# Patient Record
Sex: FEMALE | URBAN - METROPOLITAN AREA
[De-identification: names, ages, dates, MRNs, and addresses within clinical notes are randomized per-mention and may not be internally consistent; named-entity substitution may affect disease eponyms.]

---

## 2018-09-07 ENCOUNTER — FOLLOW UP (OUTPATIENT)
Dept: URBAN - METROPOLITAN AREA CLINIC 11 | Facility: CLINIC | Age: 83
End: 2018-09-07

## 2018-09-07 DIAGNOSIS — H43.813: ICD-10-CM

## 2018-09-07 DIAGNOSIS — Z96.1: ICD-10-CM

## 2018-09-07 DIAGNOSIS — H35.3231: ICD-10-CM

## 2018-09-07 PROCEDURE — 92250 FUNDUS PHOTOGRAPHY W/I&R: CPT

## 2018-09-07 PROCEDURE — 92014 COMPRE OPH EXAM EST PT 1/>: CPT

## 2018-09-07 PROCEDURE — 92235 FLUORESCEIN ANGRPH MLTIFRAME: CPT

## 2018-09-07 PROCEDURE — 92134 CPTRZ OPH DX IMG PST SGM RTA: CPT

## 2018-09-07 ASSESSMENT — TONOMETRY
OD_IOP_MMHG: 16
OS_IOP_MMHG: 14

## 2018-09-07 ASSESSMENT — VISUAL ACUITY: OS_SC: 20/400

## 2018-09-14 ENCOUNTER — PROCEDURE ONLY (OUTPATIENT)
Dept: URBAN - METROPOLITAN AREA CLINIC 11 | Facility: CLINIC | Age: 83
End: 2018-09-14

## 2018-09-14 DIAGNOSIS — H35.3231: ICD-10-CM

## 2018-09-14 PROCEDURE — PFS LUCENTIS 0.5MG PREFILLED SYRINGE

## 2018-09-14 PROCEDURE — 67028 INJECTION EYE DRUG: CPT

## 2018-09-14 ASSESSMENT — TONOMETRY: OD_IOP_MMHG: 11

## 2018-09-21 ENCOUNTER — PROCEDURE ONLY (OUTPATIENT)
Dept: URBAN - METROPOLITAN AREA CLINIC 11 | Facility: CLINIC | Age: 83
End: 2018-09-21

## 2018-09-21 DIAGNOSIS — H35.3231: ICD-10-CM

## 2018-09-21 PROCEDURE — PFS LUCENTIS 0.5MG PREFILLED SYRINGE

## 2018-09-21 PROCEDURE — 67028 INJECTION EYE DRUG: CPT | Mod: 25,LT

## 2018-09-21 ASSESSMENT — VISUAL ACUITY: OS_SC: 20/400

## 2018-09-21 ASSESSMENT — TONOMETRY: OS_IOP_MMHG: 12

## 2018-10-25 ENCOUNTER — FOLLOW UP (OUTPATIENT)
Dept: URBAN - METROPOLITAN AREA CLINIC 11 | Facility: CLINIC | Age: 83
End: 2018-10-25

## 2018-10-25 DIAGNOSIS — H35.3231: ICD-10-CM

## 2018-10-25 PROCEDURE — 67028 INJECTION EYE DRUG: CPT

## 2018-10-25 PROCEDURE — 92012 INTRM OPH EXAM EST PATIENT: CPT | Mod: 25

## 2018-10-25 PROCEDURE — 92134 CPTRZ OPH DX IMG PST SGM RTA: CPT

## 2018-10-25 PROCEDURE — PFS LUCENTIS 0.5MG PREFILLED SYRINGE

## 2018-10-25 ASSESSMENT — TONOMETRY
OD_IOP_MMHG: 11
OS_IOP_MMHG: 10

## 2018-10-25 ASSESSMENT — VISUAL ACUITY
OS_SC: 20/400
OD_SC: CF 1FT

## 2018-11-01 ENCOUNTER — FOLLOW UP (OUTPATIENT)
Dept: URBAN - METROPOLITAN AREA CLINIC 11 | Facility: CLINIC | Age: 83
End: 2018-11-01

## 2018-11-01 DIAGNOSIS — H35.3231: ICD-10-CM

## 2018-11-01 PROCEDURE — 67028 INJECTION EYE DRUG: CPT

## 2018-11-01 PROCEDURE — PFS LUCENTIS 0.5MG PREFILLED SYRINGE

## 2018-11-01 ASSESSMENT — VISUAL ACUITY: OS_SC: 20/400

## 2018-11-01 ASSESSMENT — TONOMETRY: OS_IOP_MMHG: 16

## 2018-11-28 ENCOUNTER — FOLLOW UP (OUTPATIENT)
Dept: URBAN - METROPOLITAN AREA CLINIC 11 | Facility: CLINIC | Age: 83
End: 2018-11-28

## 2018-11-28 DIAGNOSIS — H35.3231: ICD-10-CM

## 2018-11-28 PROCEDURE — 92012 INTRM OPH EXAM EST PATIENT: CPT | Mod: 25

## 2018-11-28 PROCEDURE — 67028 INJECTION EYE DRUG: CPT

## 2018-11-28 PROCEDURE — PFS LUCENTIS 0.5MG PREFILLED SYRINGE

## 2018-11-28 PROCEDURE — 92134 CPTRZ OPH DX IMG PST SGM RTA: CPT

## 2018-11-28 ASSESSMENT — VISUAL ACUITY
OD_SC: CF 1FT
OS_SC: 20/400

## 2018-11-28 ASSESSMENT — TONOMETRY
OD_IOP_MMHG: 12
OS_IOP_MMHG: 13

## 2018-11-30 ENCOUNTER — PROCEDURE ONLY (OUTPATIENT)
Dept: URBAN - METROPOLITAN AREA CLINIC 11 | Facility: CLINIC | Age: 83
End: 2018-11-30

## 2018-11-30 DIAGNOSIS — H35.3231: ICD-10-CM

## 2018-11-30 PROCEDURE — 67028 INJECTION EYE DRUG: CPT

## 2018-11-30 ASSESSMENT — VISUAL ACUITY: OS_SC: 20/400

## 2018-11-30 ASSESSMENT — TONOMETRY: OS_IOP_MMHG: 12

## 2019-05-23 ENCOUNTER — FOLLOW UP (OUTPATIENT)
Dept: URBAN - METROPOLITAN AREA CLINIC 11 | Facility: CLINIC | Age: 84
End: 2019-05-23

## 2019-05-23 DIAGNOSIS — H35.3231: ICD-10-CM

## 2019-05-23 DIAGNOSIS — Z96.1: ICD-10-CM

## 2019-05-23 DIAGNOSIS — H43.813: ICD-10-CM

## 2019-05-23 PROCEDURE — 92235 FLUORESCEIN ANGRPH MLTIFRAME: CPT

## 2019-05-23 PROCEDURE — 67028 INJECTION EYE DRUG: CPT

## 2019-05-23 PROCEDURE — 92134 CPTRZ OPH DX IMG PST SGM RTA: CPT

## 2019-05-23 PROCEDURE — PFS LUCENTIS 0.5MG PREFILLED SYRINGE

## 2019-05-23 PROCEDURE — 92014 COMPRE OPH EXAM EST PT 1/>: CPT | Mod: 25

## 2019-05-23 PROCEDURE — 92250 FUNDUS PHOTOGRAPHY W/I&R: CPT

## 2019-05-23 ASSESSMENT — VISUAL ACUITY
OD_SC: CF 1FT
OS_SC: 20/400

## 2019-05-23 ASSESSMENT — TONOMETRY
OD_IOP_MMHG: 10
OS_IOP_MMHG: 11

## 2019-07-11 ENCOUNTER — FOLLOW UP (OUTPATIENT)
Dept: URBAN - METROPOLITAN AREA CLINIC 11 | Facility: CLINIC | Age: 84
End: 2019-07-11

## 2019-07-11 DIAGNOSIS — H43.813: ICD-10-CM

## 2019-07-11 DIAGNOSIS — H35.3231: ICD-10-CM

## 2019-07-11 DIAGNOSIS — Z96.1: ICD-10-CM

## 2019-07-11 PROCEDURE — 92134 CPTRZ OPH DX IMG PST SGM RTA: CPT

## 2019-07-11 PROCEDURE — 92012 INTRM OPH EXAM EST PATIENT: CPT | Mod: 25

## 2019-07-11 PROCEDURE — PFS LUCENTIS 0.5MG PREFILLED SYRINGE

## 2019-07-11 PROCEDURE — 67028 INJECTION EYE DRUG: CPT

## 2019-07-11 ASSESSMENT — VISUAL ACUITY
OD_SC: CF 1FT
OS_SC: 20/400

## 2019-07-11 ASSESSMENT — TONOMETRY
OD_IOP_MMHG: 13
OS_IOP_MMHG: 12

## 2019-09-05 ENCOUNTER — FOLLOW UP (OUTPATIENT)
Dept: URBAN - METROPOLITAN AREA CLINIC 11 | Facility: CLINIC | Age: 84
End: 2019-09-05

## 2019-09-05 DIAGNOSIS — H35.3231: ICD-10-CM

## 2019-09-05 DIAGNOSIS — H43.813: ICD-10-CM

## 2019-09-05 DIAGNOSIS — Z96.1: ICD-10-CM

## 2019-09-05 PROCEDURE — PFS LUCENTIS 0.5MG PREFILLED SYRINGE

## 2019-09-05 PROCEDURE — 92134 CPTRZ OPH DX IMG PST SGM RTA: CPT

## 2019-09-05 PROCEDURE — 92014 COMPRE OPH EXAM EST PT 1/>: CPT | Mod: 25

## 2019-09-05 PROCEDURE — 67028 INJECTION EYE DRUG: CPT

## 2019-09-05 ASSESSMENT — TONOMETRY
OD_IOP_MMHG: 14
OS_IOP_MMHG: 12

## 2019-09-05 ASSESSMENT — VISUAL ACUITY
OS_SC: 20/400
OD_SC: CF 1FT

## 2020-01-01 ENCOUNTER — APPOINTMENT (OUTPATIENT)
Dept: GENERAL RADIOLOGY | Age: 85
DRG: 536 | End: 2020-01-01
Attending: EMERGENCY MEDICINE
Payer: MEDICARE

## 2020-01-01 ENCOUNTER — HOSPITAL ENCOUNTER (INPATIENT)
Age: 85
LOS: 1 days | DRG: 951 | End: 2020-07-06
Attending: FAMILY MEDICINE | Admitting: FAMILY MEDICINE
Payer: OTHER MISCELLANEOUS

## 2020-01-01 ENCOUNTER — HOSPITAL ENCOUNTER (INPATIENT)
Age: 85
LOS: 1 days | Discharge: HOSPICE | DRG: 065 | End: 2020-07-05
Attending: EMERGENCY MEDICINE | Admitting: INTERNAL MEDICINE
Payer: MEDICARE

## 2020-01-01 ENCOUNTER — HOSPITAL ENCOUNTER (INPATIENT)
Age: 85
LOS: 1 days | Discharge: HOME HEALTH | DRG: 536 | End: 2020-07-03
Attending: EMERGENCY MEDICINE | Admitting: INTERNAL MEDICINE
Payer: MEDICARE

## 2020-01-01 ENCOUNTER — APPOINTMENT (OUTPATIENT)
Dept: GENERAL RADIOLOGY | Age: 85
DRG: 536 | End: 2020-01-01
Attending: INTERNAL MEDICINE
Payer: MEDICARE

## 2020-01-01 ENCOUNTER — HOSPICE ADMISSION (OUTPATIENT)
Dept: HOSPICE | Facility: HOSPICE | Age: 85
End: 2020-01-01
Payer: MEDICARE

## 2020-01-01 ENCOUNTER — HOME CARE VISIT (OUTPATIENT)
Dept: HOSPICE | Facility: HOSPICE | Age: 85
End: 2020-01-01
Payer: MEDICARE

## 2020-01-01 ENCOUNTER — APPOINTMENT (OUTPATIENT)
Dept: CT IMAGING | Age: 85
DRG: 065 | End: 2020-01-01
Attending: EMERGENCY MEDICINE
Payer: MEDICARE

## 2020-01-01 VITALS
WEIGHT: 129.41 LBS | DIASTOLIC BLOOD PRESSURE: 83 MMHG | OXYGEN SATURATION: 91 % | RESPIRATION RATE: 18 BRPM | TEMPERATURE: 98.5 F | SYSTOLIC BLOOD PRESSURE: 152 MMHG | HEART RATE: 97 BPM | BODY MASS INDEX: 22.93 KG/M2 | HEIGHT: 63 IN

## 2020-01-01 VITALS
WEIGHT: 129.41 LBS | HEART RATE: 74 BPM | OXYGEN SATURATION: 97 % | BODY MASS INDEX: 21.56 KG/M2 | HEIGHT: 65 IN | DIASTOLIC BLOOD PRESSURE: 73 MMHG | RESPIRATION RATE: 18 BRPM | TEMPERATURE: 98 F | SYSTOLIC BLOOD PRESSURE: 162 MMHG

## 2020-01-01 DIAGNOSIS — I61.9 HEMORRHAGIC STROKE (HCC): Primary | ICD-10-CM

## 2020-01-01 DIAGNOSIS — S72.002A CLOSED FRACTURE OF LEFT HIP, INITIAL ENCOUNTER (HCC): ICD-10-CM

## 2020-01-01 DIAGNOSIS — R29.6 FALLS FREQUENTLY: ICD-10-CM

## 2020-01-01 DIAGNOSIS — Z71.89 GOALS OF CARE, COUNSELING/DISCUSSION: ICD-10-CM

## 2020-01-01 DIAGNOSIS — K11.7 INCREASED OROPHARYNGEAL SECRETIONS: ICD-10-CM

## 2020-01-01 DIAGNOSIS — F41.9 ANXIETY: Primary | ICD-10-CM

## 2020-01-01 DIAGNOSIS — R40.0 SOMNOLENCE: ICD-10-CM

## 2020-01-01 LAB
ABO + RH BLD: NORMAL
ALBUMIN SERPL-MCNC: 2.6 G/DL (ref 3.5–5)
ALBUMIN SERPL-MCNC: 2.7 G/DL (ref 3.5–5)
ALBUMIN SERPL-MCNC: 3 G/DL (ref 3.5–5)
ALBUMIN/GLOB SERPL: 0.7 {RATIO} (ref 1.1–2.2)
ALP SERPL-CCNC: 122 U/L (ref 45–117)
ALP SERPL-CCNC: 124 U/L (ref 45–117)
ALP SERPL-CCNC: 126 U/L (ref 45–117)
ALT SERPL-CCNC: 24 U/L (ref 12–78)
ALT SERPL-CCNC: 25 U/L (ref 12–78)
ALT SERPL-CCNC: 87 U/L (ref 12–78)
ANION GAP SERPL CALC-SCNC: 6 MMOL/L (ref 5–15)
ANION GAP SERPL CALC-SCNC: 7 MMOL/L (ref 5–15)
AST SERPL-CCNC: 18 U/L (ref 15–37)
AST SERPL-CCNC: 19 U/L (ref 15–37)
AST SERPL-CCNC: 57 U/L (ref 15–37)
ATRIAL RATE: 86 BPM
BASOPHILS # BLD: 0 K/UL (ref 0–0.1)
BASOPHILS NFR BLD: 0 % (ref 0–1)
BASOPHILS NFR BLD: 1 % (ref 0–1)
BILIRUB SERPL-MCNC: 0.3 MG/DL (ref 0.2–1)
BILIRUB SERPL-MCNC: 0.4 MG/DL (ref 0.2–1)
BILIRUB SERPL-MCNC: 0.4 MG/DL (ref 0.2–1)
BLOOD GROUP ANTIBODIES SERPL: NORMAL
BUN SERPL-MCNC: 18 MG/DL (ref 6–20)
BUN SERPL-MCNC: 20 MG/DL (ref 6–20)
BUN SERPL-MCNC: 21 MG/DL (ref 6–20)
BUN SERPL-MCNC: 23 MG/DL (ref 6–20)
BUN/CREAT SERPL: 18 (ref 12–20)
BUN/CREAT SERPL: 18 (ref 12–20)
BUN/CREAT SERPL: 22 (ref 12–20)
BUN/CREAT SERPL: 22 (ref 12–20)
CALCIUM SERPL-MCNC: 8.4 MG/DL (ref 8.5–10.1)
CALCIUM SERPL-MCNC: 8.6 MG/DL (ref 8.5–10.1)
CALCIUM SERPL-MCNC: 8.7 MG/DL (ref 8.5–10.1)
CALCIUM SERPL-MCNC: 8.8 MG/DL (ref 8.5–10.1)
CALCULATED P AXIS, ECG09: 38 DEGREES
CALCULATED R AXIS, ECG10: -17 DEGREES
CALCULATED T AXIS, ECG11: -3 DEGREES
CHLORIDE SERPL-SCNC: 104 MMOL/L (ref 97–108)
CHLORIDE SERPL-SCNC: 105 MMOL/L (ref 97–108)
CHLORIDE SERPL-SCNC: 107 MMOL/L (ref 97–108)
CHLORIDE SERPL-SCNC: 108 MMOL/L (ref 97–108)
CO2 SERPL-SCNC: 22 MMOL/L (ref 21–32)
CO2 SERPL-SCNC: 22 MMOL/L (ref 21–32)
CO2 SERPL-SCNC: 24 MMOL/L (ref 21–32)
CO2 SERPL-SCNC: 26 MMOL/L (ref 21–32)
COMMENT, HOLDF: NORMAL
CREAT SERPL-MCNC: 0.83 MG/DL (ref 0.55–1.02)
CREAT SERPL-MCNC: 0.91 MG/DL (ref 0.55–1.02)
CREAT SERPL-MCNC: 1.19 MG/DL (ref 0.55–1.02)
CREAT SERPL-MCNC: 1.28 MG/DL (ref 0.55–1.02)
DIAGNOSIS, 93000: NORMAL
DIFFERENTIAL METHOD BLD: ABNORMAL
EOSINOPHIL # BLD: 0 K/UL (ref 0–0.4)
EOSINOPHIL # BLD: 0 K/UL (ref 0–0.4)
EOSINOPHIL # BLD: 0.2 K/UL (ref 0–0.4)
EOSINOPHIL # BLD: 0.2 K/UL (ref 0–0.4)
EOSINOPHIL NFR BLD: 0 % (ref 0–7)
EOSINOPHIL NFR BLD: 0 % (ref 0–7)
EOSINOPHIL NFR BLD: 3 % (ref 0–7)
EOSINOPHIL NFR BLD: 3 % (ref 0–7)
ERYTHROCYTE [DISTWIDTH] IN BLOOD BY AUTOMATED COUNT: 14.1 % (ref 11.5–14.5)
ERYTHROCYTE [DISTWIDTH] IN BLOOD BY AUTOMATED COUNT: 14.1 % (ref 11.5–14.5)
ERYTHROCYTE [DISTWIDTH] IN BLOOD BY AUTOMATED COUNT: 14.3 % (ref 11.5–14.5)
ERYTHROCYTE [DISTWIDTH] IN BLOOD BY AUTOMATED COUNT: 14.3 % (ref 11.5–14.5)
GLOBULIN SER CALC-MCNC: 3.7 G/DL (ref 2–4)
GLOBULIN SER CALC-MCNC: 4.1 G/DL (ref 2–4)
GLOBULIN SER CALC-MCNC: 4.1 G/DL (ref 2–4)
GLUCOSE BLD STRIP.AUTO-MCNC: 149 MG/DL (ref 65–100)
GLUCOSE SERPL-MCNC: 101 MG/DL (ref 65–100)
GLUCOSE SERPL-MCNC: 102 MG/DL (ref 65–100)
GLUCOSE SERPL-MCNC: 109 MG/DL (ref 65–100)
GLUCOSE SERPL-MCNC: 147 MG/DL (ref 65–100)
HCT VFR BLD AUTO: 34.6 % (ref 35–47)
HCT VFR BLD AUTO: 34.9 % (ref 35–47)
HCT VFR BLD AUTO: 34.9 % (ref 35–47)
HCT VFR BLD AUTO: 37.5 % (ref 35–47)
HGB BLD-MCNC: 11.3 G/DL (ref 11.5–16)
HGB BLD-MCNC: 11.5 G/DL (ref 11.5–16)
HGB BLD-MCNC: 11.8 G/DL (ref 11.5–16)
HGB BLD-MCNC: 12.2 G/DL (ref 11.5–16)
IMM GRANULOCYTES # BLD AUTO: 0 K/UL (ref 0–0.04)
IMM GRANULOCYTES # BLD AUTO: 0.1 K/UL (ref 0–0.04)
IMM GRANULOCYTES NFR BLD AUTO: 1 % (ref 0–0.5)
INR PPP: 1 (ref 0.9–1.1)
INR PPP: 1 (ref 0.9–1.1)
LYMPHOCYTES # BLD: 0.9 K/UL (ref 0.8–3.5)
LYMPHOCYTES # BLD: 1.6 K/UL (ref 0.8–3.5)
LYMPHOCYTES # BLD: 1.9 K/UL (ref 0.8–3.5)
LYMPHOCYTES # BLD: 2 K/UL (ref 0.8–3.5)
LYMPHOCYTES NFR BLD: 12 % (ref 12–49)
LYMPHOCYTES NFR BLD: 21 % (ref 12–49)
LYMPHOCYTES NFR BLD: 28 % (ref 12–49)
LYMPHOCYTES NFR BLD: 29 % (ref 12–49)
MCH RBC QN AUTO: 28 PG (ref 26–34)
MCH RBC QN AUTO: 28.4 PG (ref 26–34)
MCH RBC QN AUTO: 29 PG (ref 26–34)
MCH RBC QN AUTO: 29.1 PG (ref 26–34)
MCHC RBC AUTO-ENTMCNC: 32.4 G/DL (ref 30–36.5)
MCHC RBC AUTO-ENTMCNC: 32.5 G/DL (ref 30–36.5)
MCHC RBC AUTO-ENTMCNC: 33.2 G/DL (ref 30–36.5)
MCHC RBC AUTO-ENTMCNC: 33.8 G/DL (ref 30–36.5)
MCV RBC AUTO: 86 FL (ref 80–99)
MCV RBC AUTO: 86.6 FL (ref 80–99)
MCV RBC AUTO: 87.4 FL (ref 80–99)
MCV RBC AUTO: 87.4 FL (ref 80–99)
MONOCYTES # BLD: 0.4 K/UL (ref 0–1)
MONOCYTES # BLD: 0.8 K/UL (ref 0–1)
MONOCYTES # BLD: 0.8 K/UL (ref 0–1)
MONOCYTES # BLD: 1.1 K/UL (ref 0–1)
MONOCYTES NFR BLD: 12 % (ref 5–13)
MONOCYTES NFR BLD: 12 % (ref 5–13)
MONOCYTES NFR BLD: 15 % (ref 5–13)
MONOCYTES NFR BLD: 6 % (ref 5–13)
NEUTS SEG # BLD: 3.6 K/UL (ref 1.8–8)
NEUTS SEG # BLD: 3.9 K/UL (ref 1.8–8)
NEUTS SEG # BLD: 4.7 K/UL (ref 1.8–8)
NEUTS SEG # BLD: 5.6 K/UL (ref 1.8–8)
NEUTS SEG NFR BLD: 54 % (ref 32–75)
NEUTS SEG NFR BLD: 56 % (ref 32–75)
NEUTS SEG NFR BLD: 63 % (ref 32–75)
NEUTS SEG NFR BLD: 81 % (ref 32–75)
NRBC # BLD: 0 K/UL (ref 0–0.01)
NRBC BLD-RTO: 0 PER 100 WBC
P-R INTERVAL, ECG05: 168 MS
PLATELET # BLD AUTO: 240 K/UL (ref 150–400)
PLATELET # BLD AUTO: 251 K/UL (ref 150–400)
PLATELET # BLD AUTO: 268 K/UL (ref 150–400)
PLATELET # BLD AUTO: 307 K/UL (ref 150–400)
PMV BLD AUTO: 9.2 FL (ref 8.9–12.9)
PMV BLD AUTO: 9.4 FL (ref 8.9–12.9)
PMV BLD AUTO: 9.4 FL (ref 8.9–12.9)
PMV BLD AUTO: 9.5 FL (ref 8.9–12.9)
POTASSIUM SERPL-SCNC: 4.1 MMOL/L (ref 3.5–5.1)
POTASSIUM SERPL-SCNC: 4.2 MMOL/L (ref 3.5–5.1)
POTASSIUM SERPL-SCNC: 4.3 MMOL/L (ref 3.5–5.1)
POTASSIUM SERPL-SCNC: 4.3 MMOL/L (ref 3.5–5.1)
PROT SERPL-MCNC: 6.3 G/DL (ref 6.4–8.2)
PROT SERPL-MCNC: 6.8 G/DL (ref 6.4–8.2)
PROT SERPL-MCNC: 7.1 G/DL (ref 6.4–8.2)
PROTHROMBIN TIME: 10.4 SEC (ref 9–11.1)
PROTHROMBIN TIME: 10.9 SEC (ref 9–11.1)
Q-T INTERVAL, ECG07: 350 MS
QRS DURATION, ECG06: 60 MS
QTC CALCULATION (BEZET), ECG08: 418 MS
RBC # BLD AUTO: 3.96 M/UL (ref 3.8–5.2)
RBC # BLD AUTO: 4.03 M/UL (ref 3.8–5.2)
RBC # BLD AUTO: 4.06 M/UL (ref 3.8–5.2)
RBC # BLD AUTO: 4.29 M/UL (ref 3.8–5.2)
SAMPLES BEING HELD,HOLD: NORMAL
SARS-COV-2, COV2: NOT DETECTED
SERVICE CMNT-IMP: ABNORMAL
SODIUM SERPL-SCNC: 135 MMOL/L (ref 136–145)
SODIUM SERPL-SCNC: 136 MMOL/L (ref 136–145)
SODIUM SERPL-SCNC: 137 MMOL/L (ref 136–145)
SODIUM SERPL-SCNC: 137 MMOL/L (ref 136–145)
SOURCE, COVRS: NORMAL
SPECIMEN EXP DATE BLD: NORMAL
SPECIMEN SOURCE, FCOV2M: NORMAL
TROPONIN I SERPL-MCNC: <0.05 NG/ML
VENTRICULAR RATE, ECG03: 86 BPM
WBC # BLD AUTO: 6.6 K/UL (ref 3.6–11)
WBC # BLD AUTO: 7 K/UL (ref 3.6–11)
WBC # BLD AUTO: 7 K/UL (ref 3.6–11)
WBC # BLD AUTO: 7.5 K/UL (ref 3.6–11)

## 2020-01-01 PROCEDURE — 74011250636 HC RX REV CODE- 250/636: Performed by: PHYSICAL MEDICINE & REHABILITATION

## 2020-01-01 PROCEDURE — 93005 ELECTROCARDIOGRAM TRACING: CPT

## 2020-01-01 PROCEDURE — 3336500001 HSPC ELECTION

## 2020-01-01 PROCEDURE — 36415 COLL VENOUS BLD VENIPUNCTURE: CPT

## 2020-01-01 PROCEDURE — 65270000015 HC RM PRIVATE ONCOLOGY

## 2020-01-01 PROCEDURE — 80048 BASIC METABOLIC PNL TOTAL CA: CPT

## 2020-01-01 PROCEDURE — 74011250637 HC RX REV CODE- 250/637: Performed by: INTERNAL MEDICINE

## 2020-01-01 PROCEDURE — 71045 X-RAY EXAM CHEST 1 VIEW: CPT

## 2020-01-01 PROCEDURE — 74011250636 HC RX REV CODE- 250/636: Performed by: INTERNAL MEDICINE

## 2020-01-01 PROCEDURE — 74011000250 HC RX REV CODE- 250: Performed by: NURSE PRACTITIONER

## 2020-01-01 PROCEDURE — 74011636637 HC RX REV CODE- 636/637: Performed by: INTERNAL MEDICINE

## 2020-01-01 PROCEDURE — 80053 COMPREHEN METABOLIC PANEL: CPT

## 2020-01-01 PROCEDURE — 77030005513 HC CATH URETH FOL11 MDII -B

## 2020-01-01 PROCEDURE — 70450 CT HEAD/BRAIN W/O DYE: CPT

## 2020-01-01 PROCEDURE — 96374 THER/PROPH/DIAG INJ IV PUSH: CPT

## 2020-01-01 PROCEDURE — 77030013160 HC PROTCT ARM THMB DERY -A

## 2020-01-01 PROCEDURE — 85610 PROTHROMBIN TIME: CPT

## 2020-01-01 PROCEDURE — 97530 THERAPEUTIC ACTIVITIES: CPT

## 2020-01-01 PROCEDURE — 86900 BLOOD TYPING SEROLOGIC ABO: CPT

## 2020-01-01 PROCEDURE — 87635 SARS-COV-2 COVID-19 AMP PRB: CPT

## 2020-01-01 PROCEDURE — 0656 HSPC GENERAL INPATIENT

## 2020-01-01 PROCEDURE — 97535 SELF CARE MNGMENT TRAINING: CPT | Performed by: OCCUPATIONAL THERAPIST

## 2020-01-01 PROCEDURE — 82962 GLUCOSE BLOOD TEST: CPT

## 2020-01-01 PROCEDURE — 99285 EMERGENCY DEPT VISIT HI MDM: CPT

## 2020-01-01 PROCEDURE — 85025 COMPLETE CBC W/AUTO DIFF WBC: CPT

## 2020-01-01 PROCEDURE — 97165 OT EVAL LOW COMPLEX 30 MIN: CPT | Performed by: OCCUPATIONAL THERAPIST

## 2020-01-01 PROCEDURE — 97161 PT EVAL LOW COMPLEX 20 MIN: CPT | Performed by: PHYSICAL THERAPIST

## 2020-01-01 PROCEDURE — 77030041247 HC PROTECTOR HEEL HEELMEDIX MDII -B

## 2020-01-01 PROCEDURE — 96372 THER/PROPH/DIAG INJ SC/IM: CPT

## 2020-01-01 PROCEDURE — 74011000250 HC RX REV CODE- 250: Performed by: PHYSICAL MEDICINE & REHABILITATION

## 2020-01-01 PROCEDURE — 99218 HC RM OBSERVATION: CPT

## 2020-01-01 PROCEDURE — 84484 ASSAY OF TROPONIN QUANT: CPT

## 2020-01-01 PROCEDURE — 77030038269 HC DRN EXT URIN PURWCK BARD -A

## 2020-01-01 PROCEDURE — 65270000029 HC RM PRIVATE

## 2020-01-01 PROCEDURE — 74011250636 HC RX REV CODE- 250/636: Performed by: NURSE PRACTITIONER

## 2020-01-01 PROCEDURE — 73502 X-RAY EXAM HIP UNI 2-3 VIEWS: CPT

## 2020-01-01 PROCEDURE — 97530 THERAPEUTIC ACTIVITIES: CPT | Performed by: PHYSICAL THERAPIST

## 2020-01-01 PROCEDURE — 94760 N-INVAS EAR/PLS OXIMETRY 1: CPT

## 2020-01-01 PROCEDURE — 97530 THERAPEUTIC ACTIVITIES: CPT | Performed by: OCCUPATIONAL THERAPIST

## 2020-01-01 RX ORDER — PREDNISONE 10 MG/1
TABLET ORAL
Qty: 30 TAB | Refills: 0 | Status: SHIPPED | OUTPATIENT
Start: 2020-01-01

## 2020-01-01 RX ORDER — LORAZEPAM 2 MG/ML
1 INJECTION INTRAMUSCULAR
Status: DISCONTINUED | OUTPATIENT
Start: 2020-01-01 | End: 2020-01-01 | Stop reason: HOSPADM

## 2020-01-01 RX ORDER — FACIAL-BODY WIPES
10 EACH TOPICAL DAILY PRN
Status: DISCONTINUED | OUTPATIENT
Start: 2020-01-01 | End: 2020-01-01 | Stop reason: HOSPADM

## 2020-01-01 RX ORDER — LEVOTHYROXINE SODIUM 25 UG/1
25 TABLET ORAL
COMMUNITY

## 2020-01-01 RX ORDER — METOPROLOL TARTRATE 25 MG/1
25 TABLET, FILM COATED ORAL 2 TIMES DAILY
Status: DISCONTINUED | OUTPATIENT
Start: 2020-01-01 | End: 2020-01-01 | Stop reason: HOSPADM

## 2020-01-01 RX ORDER — ONDANSETRON 2 MG/ML
4 INJECTION INTRAMUSCULAR; INTRAVENOUS
Status: DISCONTINUED | OUTPATIENT
Start: 2020-01-01 | End: 2020-01-01 | Stop reason: HOSPADM

## 2020-01-01 RX ORDER — MORPHINE SULFATE 2 MG/ML
2 INJECTION, SOLUTION INTRAMUSCULAR; INTRAVENOUS
Status: DISCONTINUED | OUTPATIENT
Start: 2020-01-01 | End: 2020-01-01 | Stop reason: HOSPADM

## 2020-01-01 RX ORDER — POTASSIUM CHLORIDE 750 MG/1
10 TABLET, FILM COATED, EXTENDED RELEASE ORAL DAILY
COMMUNITY
End: 2020-01-01

## 2020-01-01 RX ORDER — GLYCOPYRROLATE 0.2 MG/ML
0.1 INJECTION INTRAMUSCULAR; INTRAVENOUS
Status: DISCONTINUED | OUTPATIENT
Start: 2020-01-01 | End: 2020-01-01 | Stop reason: HOSPADM

## 2020-01-01 RX ORDER — LOSARTAN POTASSIUM 100 MG/1
100 TABLET ORAL DAILY
COMMUNITY

## 2020-01-01 RX ORDER — SODIUM CHLORIDE 0.9 % (FLUSH) 0.9 %
10 SYRINGE (ML) INJECTION
Status: DISCONTINUED | OUTPATIENT
Start: 2020-01-01 | End: 2020-01-01

## 2020-01-01 RX ORDER — SODIUM CHLORIDE 9 MG/ML
75 INJECTION, SOLUTION INTRAVENOUS CONTINUOUS
Status: DISPENSED | OUTPATIENT
Start: 2020-01-01 | End: 2020-01-01

## 2020-01-01 RX ORDER — GLYCOPYRROLATE 0.2 MG/ML
0.1 INJECTION INTRAMUSCULAR; INTRAVENOUS ONCE
Status: DISCONTINUED | OUTPATIENT
Start: 2020-01-01 | End: 2020-01-01

## 2020-01-01 RX ORDER — BUSPIRONE HYDROCHLORIDE 5 MG/1
5 TABLET ORAL
COMMUNITY

## 2020-01-01 RX ORDER — ACETAMINOPHEN 500 MG
500 TABLET ORAL
COMMUNITY

## 2020-01-01 RX ORDER — FAMOTIDINE 20 MG/1
20 TABLET, FILM COATED ORAL DAILY
Status: DISCONTINUED | OUTPATIENT
Start: 2020-01-01 | End: 2020-01-01

## 2020-01-01 RX ORDER — HYOSCYAMINE SULFATE 0.12 MG/1
0.12 TABLET SUBLINGUAL EVERY 4 HOURS
Status: DISCONTINUED | OUTPATIENT
Start: 2020-01-01 | End: 2020-01-01

## 2020-01-01 RX ORDER — LEVOTHYROXINE SODIUM 25 UG/1
25 TABLET ORAL
Status: DISCONTINUED | OUTPATIENT
Start: 2020-01-01 | End: 2020-01-01

## 2020-01-01 RX ORDER — METOPROLOL TARTRATE 25 MG/1
25 TABLET, FILM COATED ORAL 2 TIMES DAILY
COMMUNITY

## 2020-01-01 RX ORDER — GLYCOPYRROLATE 0.2 MG/ML
0.2 INJECTION INTRAMUSCULAR; INTRAVENOUS EVERY 4 HOURS
Status: DISCONTINUED | OUTPATIENT
Start: 2020-01-01 | End: 2020-01-01 | Stop reason: HOSPADM

## 2020-01-01 RX ORDER — HEPARIN SODIUM 5000 [USP'U]/ML
5000 INJECTION, SOLUTION INTRAVENOUS; SUBCUTANEOUS EVERY 8 HOURS
Status: DISCONTINUED | OUTPATIENT
Start: 2020-01-01 | End: 2020-01-01 | Stop reason: HOSPADM

## 2020-01-01 RX ORDER — LEVOTHYROXINE SODIUM 25 UG/1
25 TABLET ORAL
Status: DISCONTINUED | OUTPATIENT
Start: 2020-01-01 | End: 2020-01-01 | Stop reason: HOSPADM

## 2020-01-01 RX ORDER — FLUOXETINE HYDROCHLORIDE 20 MG/1
20 CAPSULE ORAL DAILY
Status: DISCONTINUED | OUTPATIENT
Start: 2020-01-01 | End: 2020-01-01 | Stop reason: HOSPADM

## 2020-01-01 RX ORDER — LOSARTAN POTASSIUM 100 MG/1
100 TABLET ORAL DAILY
COMMUNITY
End: 2020-01-01

## 2020-01-01 RX ORDER — ACETAMINOPHEN 325 MG/1
650 TABLET ORAL
Status: DISCONTINUED | OUTPATIENT
Start: 2020-01-01 | End: 2020-01-01 | Stop reason: HOSPADM

## 2020-01-01 RX ORDER — CLONAZEPAM 0.5 MG/1
0.5 TABLET ORAL DAILY
Status: ON HOLD | COMMUNITY
End: 2020-01-01 | Stop reason: SDUPTHER

## 2020-01-01 RX ORDER — HALOPERIDOL 5 MG/ML
2 INJECTION INTRAMUSCULAR
Status: DISCONTINUED | OUTPATIENT
Start: 2020-01-01 | End: 2020-01-01 | Stop reason: HOSPADM

## 2020-01-01 RX ORDER — BUSPIRONE HYDROCHLORIDE 5 MG/1
5 TABLET ORAL
Status: DISCONTINUED | OUTPATIENT
Start: 2020-01-01 | End: 2020-01-01 | Stop reason: HOSPADM

## 2020-01-01 RX ORDER — MORPHINE SULFATE 2 MG/ML
1 INJECTION, SOLUTION INTRAMUSCULAR; INTRAVENOUS
Status: DISCONTINUED | OUTPATIENT
Start: 2020-01-01 | End: 2020-01-01 | Stop reason: HOSPADM

## 2020-01-01 RX ORDER — POTASSIUM CHLORIDE 750 MG/1
TABLET, EXTENDED RELEASE ORAL
COMMUNITY
Start: 2020-01-01

## 2020-01-01 RX ORDER — SODIUM CHLORIDE 0.9 % (FLUSH) 0.9 %
5-40 SYRINGE (ML) INJECTION AS NEEDED
Status: DISCONTINUED | OUTPATIENT
Start: 2020-01-01 | End: 2020-01-01 | Stop reason: HOSPADM

## 2020-01-01 RX ORDER — LIDOCAINE 50 MG/G
1 PATCH TOPICAL 2 TIMES DAILY
COMMUNITY

## 2020-01-01 RX ORDER — LORAZEPAM 2 MG/ML
1 INJECTION INTRAMUSCULAR EVERY 4 HOURS
Status: DISCONTINUED | OUTPATIENT
Start: 2020-01-01 | End: 2020-01-01 | Stop reason: HOSPADM

## 2020-01-01 RX ORDER — MORPHINE SULFATE 2 MG/ML
2 INJECTION, SOLUTION INTRAMUSCULAR; INTRAVENOUS EVERY 4 HOURS
Status: DISCONTINUED | OUTPATIENT
Start: 2020-01-01 | End: 2020-01-01 | Stop reason: HOSPADM

## 2020-01-01 RX ORDER — CLONAZEPAM 0.5 MG/1
0.5 TABLET ORAL DAILY
Qty: 15 TAB | Refills: 0 | Status: SHIPPED | OUTPATIENT
Start: 2020-01-01 | End: 2020-01-01

## 2020-01-01 RX ORDER — KETOROLAC TROMETHAMINE 30 MG/ML
30 INJECTION, SOLUTION INTRAMUSCULAR; INTRAVENOUS
Status: DISCONTINUED | OUTPATIENT
Start: 2020-01-01 | End: 2020-01-01 | Stop reason: HOSPADM

## 2020-01-01 RX ORDER — CLONAZEPAM 0.5 MG/1
0.5 TABLET ORAL DAILY
Status: DISCONTINUED | OUTPATIENT
Start: 2020-01-01 | End: 2020-01-01 | Stop reason: HOSPADM

## 2020-01-01 RX ORDER — FAMOTIDINE 20 MG/1
20 TABLET, FILM COATED ORAL 2 TIMES DAILY
Status: DISCONTINUED | OUTPATIENT
Start: 2020-01-01 | End: 2020-01-01

## 2020-01-01 RX ORDER — HYDRALAZINE HYDROCHLORIDE 20 MG/ML
10 INJECTION INTRAMUSCULAR; INTRAVENOUS
Status: DISCONTINUED | OUTPATIENT
Start: 2020-01-01 | End: 2020-01-01 | Stop reason: HOSPADM

## 2020-01-01 RX ORDER — PREDNISONE 50 MG/1
50 TABLET ORAL DAILY
Status: ON HOLD | COMMUNITY
End: 2020-01-01 | Stop reason: SDUPTHER

## 2020-01-01 RX ORDER — FLUOXETINE HYDROCHLORIDE 20 MG/1
20 CAPSULE ORAL DAILY
COMMUNITY

## 2020-01-01 RX ORDER — SODIUM CHLORIDE 9 MG/ML
100 INJECTION, SOLUTION INTRAVENOUS CONTINUOUS
Status: DISPENSED | OUTPATIENT
Start: 2020-01-01 | End: 2020-01-01

## 2020-01-01 RX ORDER — SODIUM CHLORIDE 0.9 % (FLUSH) 0.9 %
5-40 SYRINGE (ML) INJECTION EVERY 8 HOURS
Status: DISCONTINUED | OUTPATIENT
Start: 2020-01-01 | End: 2020-01-01 | Stop reason: HOSPADM

## 2020-01-01 RX ORDER — ACETAMINOPHEN 650 MG/1
650 SUPPOSITORY RECTAL
Status: DISCONTINUED | OUTPATIENT
Start: 2020-01-01 | End: 2020-01-01 | Stop reason: HOSPADM

## 2020-01-01 RX ORDER — HALOPERIDOL 5 MG/ML
1 INJECTION INTRAMUSCULAR ONCE
Status: COMPLETED | OUTPATIENT
Start: 2020-01-01 | End: 2020-01-01

## 2020-01-01 RX ADMIN — HALOPERIDOL LACTATE 1 MG: 5 INJECTION, SOLUTION INTRAMUSCULAR at 11:22

## 2020-01-01 RX ADMIN — METOPROLOL TARTRATE 25 MG: 25 TABLET, FILM COATED ORAL at 17:58

## 2020-01-01 RX ADMIN — MORPHINE SULFATE 2 MG: 2 INJECTION, SOLUTION INTRAMUSCULAR; INTRAVENOUS at 20:10

## 2020-01-01 RX ADMIN — HEPARIN SODIUM 5000 UNITS: 5000 INJECTION INTRAVENOUS; SUBCUTANEOUS at 08:15

## 2020-01-01 RX ADMIN — BUSPIRONE HYDROCHLORIDE 5 MG: 5 TABLET ORAL at 12:00

## 2020-01-01 RX ADMIN — HEPARIN SODIUM 5000 UNITS: 5000 INJECTION INTRAVENOUS; SUBCUTANEOUS at 02:13

## 2020-01-01 RX ADMIN — LORAZEPAM 1 MG: 2 INJECTION INTRAMUSCULAR; INTRAVENOUS at 23:39

## 2020-01-01 RX ADMIN — METOPROLOL TARTRATE 25 MG: 25 TABLET, FILM COATED ORAL at 18:04

## 2020-01-01 RX ADMIN — PREDNISONE 50 MG: 20 TABLET ORAL at 09:12

## 2020-01-01 RX ADMIN — HEPARIN SODIUM 5000 UNITS: 5000 INJECTION INTRAVENOUS; SUBCUTANEOUS at 08:57

## 2020-01-01 RX ADMIN — BUSPIRONE HYDROCHLORIDE 5 MG: 5 TABLET ORAL at 08:14

## 2020-01-01 RX ADMIN — FLUOXETINE 20 MG: 20 CAPSULE ORAL at 08:14

## 2020-01-01 RX ADMIN — LORAZEPAM 1 MG: 2 INJECTION INTRAMUSCULAR; INTRAVENOUS at 20:10

## 2020-01-01 RX ADMIN — HEPARIN SODIUM 5000 UNITS: 5000 INJECTION INTRAVENOUS; SUBCUTANEOUS at 17:46

## 2020-01-01 RX ADMIN — HEPARIN SODIUM 5000 UNITS: 5000 INJECTION INTRAVENOUS; SUBCUTANEOUS at 09:14

## 2020-01-01 RX ADMIN — PREDNISONE 50 MG: 20 TABLET ORAL at 08:57

## 2020-01-01 RX ADMIN — Medication 10 ML: at 05:58

## 2020-01-01 RX ADMIN — CLONAZEPAM 0.5 MG: 0.5 TABLET ORAL at 09:25

## 2020-01-01 RX ADMIN — Medication 10 ML: at 06:47

## 2020-01-01 RX ADMIN — GLYCOPYRROLATE 0.2 MG: 0.2 INJECTION, SOLUTION INTRAMUSCULAR; INTRAVENOUS at 12:30

## 2020-01-01 RX ADMIN — LEVOTHYROXINE SODIUM 25 MCG: 0.03 TABLET ORAL at 05:20

## 2020-01-01 RX ADMIN — HEPARIN SODIUM 5000 UNITS: 5000 INJECTION INTRAVENOUS; SUBCUTANEOUS at 18:04

## 2020-01-01 RX ADMIN — BUSPIRONE HYDROCHLORIDE 5 MG: 5 TABLET ORAL at 08:57

## 2020-01-01 RX ADMIN — BUSPIRONE HYDROCHLORIDE 5 MG: 5 TABLET ORAL at 17:47

## 2020-01-01 RX ADMIN — METOPROLOL TARTRATE 25 MG: 25 TABLET, FILM COATED ORAL at 08:58

## 2020-01-01 RX ADMIN — METOPROLOL TARTRATE 25 MG: 25 TABLET, FILM COATED ORAL at 08:14

## 2020-01-01 RX ADMIN — BUSPIRONE HYDROCHLORIDE 5 MG: 5 TABLET ORAL at 18:04

## 2020-01-01 RX ADMIN — SODIUM CHLORIDE 100 ML/HR: 900 INJECTION, SOLUTION INTRAVENOUS at 18:17

## 2020-01-01 RX ADMIN — HEPARIN SODIUM 5000 UNITS: 5000 INJECTION INTRAVENOUS; SUBCUTANEOUS at 09:49

## 2020-01-01 RX ADMIN — LORAZEPAM 1 MG: 2 INJECTION INTRAMUSCULAR; INTRAVENOUS at 16:01

## 2020-01-01 RX ADMIN — METOPROLOL TARTRATE 25 MG: 25 TABLET, FILM COATED ORAL at 17:47

## 2020-01-01 RX ADMIN — MORPHINE SULFATE 2 MG: 2 INJECTION, SOLUTION INTRAMUSCULAR; INTRAVENOUS at 18:34

## 2020-01-01 RX ADMIN — MORPHINE SULFATE 2 MG: 2 INJECTION, SOLUTION INTRAMUSCULAR; INTRAVENOUS at 16:00

## 2020-01-01 RX ADMIN — METOPROLOL TARTRATE 25 MG: 25 TABLET, FILM COATED ORAL at 09:13

## 2020-01-01 RX ADMIN — MORPHINE SULFATE 2 MG: 2 INJECTION, SOLUTION INTRAMUSCULAR; INTRAVENOUS at 23:40

## 2020-01-01 RX ADMIN — FLUOXETINE 20 MG: 20 CAPSULE ORAL at 09:47

## 2020-01-01 RX ADMIN — BUSPIRONE HYDROCHLORIDE 5 MG: 5 TABLET ORAL at 11:19

## 2020-01-01 RX ADMIN — HALOPERIDOL LACTATE 2 MG: 5 INJECTION, SOLUTION INTRAMUSCULAR at 00:12

## 2020-01-01 RX ADMIN — HEPARIN SODIUM 5000 UNITS: 5000 INJECTION INTRAVENOUS; SUBCUTANEOUS at 17:47

## 2020-01-01 RX ADMIN — METOPROLOL TARTRATE 25 MG: 25 TABLET, FILM COATED ORAL at 17:45

## 2020-01-01 RX ADMIN — LEVOTHYROXINE SODIUM 25 MCG: 0.03 TABLET ORAL at 06:19

## 2020-01-01 RX ADMIN — CLONAZEPAM 0.5 MG: 0.5 TABLET ORAL at 08:57

## 2020-01-01 RX ADMIN — GLYCOPYRROLATE 0.2 MG: 0.2 INJECTION, SOLUTION INTRAMUSCULAR; INTRAVENOUS at 20:10

## 2020-01-01 RX ADMIN — Medication 10 ML: at 13:03

## 2020-01-01 RX ADMIN — GLYCOPYRROLATE 0.2 MG: 0.2 INJECTION, SOLUTION INTRAMUSCULAR; INTRAVENOUS at 16:00

## 2020-01-01 RX ADMIN — SODIUM CHLORIDE 75 ML/HR: 900 INJECTION, SOLUTION INTRAVENOUS at 20:26

## 2020-01-01 RX ADMIN — LEVOTHYROXINE SODIUM 25 MCG: 0.03 TABLET ORAL at 06:54

## 2020-01-01 RX ADMIN — Medication 10 ML: at 06:21

## 2020-01-01 RX ADMIN — BUSPIRONE HYDROCHLORIDE 5 MG: 5 TABLET ORAL at 09:44

## 2020-01-01 RX ADMIN — HEPARIN SODIUM 5000 UNITS: 5000 INJECTION INTRAVENOUS; SUBCUTANEOUS at 01:17

## 2020-01-01 RX ADMIN — Medication 10 ML: at 21:01

## 2020-01-01 RX ADMIN — Medication 10 ML: at 23:31

## 2020-01-01 RX ADMIN — BUSPIRONE HYDROCHLORIDE 5 MG: 5 TABLET ORAL at 12:33

## 2020-01-01 RX ADMIN — SODIUM CHLORIDE 100 ML/HR: 900 INJECTION, SOLUTION INTRAVENOUS at 06:54

## 2020-01-01 RX ADMIN — LORAZEPAM 1 MG: 2 INJECTION INTRAMUSCULAR; INTRAVENOUS at 12:31

## 2020-01-01 RX ADMIN — MORPHINE SULFATE 2 MG: 2 INJECTION, SOLUTION INTRAMUSCULAR; INTRAVENOUS at 12:31

## 2020-01-01 RX ADMIN — GLYCOPYRROLATE 0.2 MG: 0.2 INJECTION, SOLUTION INTRAMUSCULAR; INTRAVENOUS at 23:39

## 2020-01-01 RX ADMIN — BUSPIRONE HYDROCHLORIDE 5 MG: 5 TABLET ORAL at 17:58

## 2020-01-01 RX ADMIN — PREDNISONE 50 MG: 20 TABLET ORAL at 09:47

## 2020-01-01 RX ADMIN — LEVOTHYROXINE SODIUM 25 MCG: 0.03 TABLET ORAL at 05:57

## 2020-01-01 RX ADMIN — BUSPIRONE HYDROCHLORIDE 5 MG: 5 TABLET ORAL at 13:03

## 2020-01-01 RX ADMIN — METOPROLOL TARTRATE 25 MG: 25 TABLET, FILM COATED ORAL at 09:44

## 2020-01-01 RX ADMIN — ACETAMINOPHEN 650 MG: 325 TABLET ORAL at 02:13

## 2020-01-01 RX ADMIN — BUSPIRONE HYDROCHLORIDE 5 MG: 5 TABLET ORAL at 16:45

## 2020-01-01 RX ADMIN — Medication 10 ML: at 22:19

## 2020-01-01 RX ADMIN — PREDNISONE 50 MG: 20 TABLET ORAL at 08:14

## 2020-01-01 RX ADMIN — LORAZEPAM 1 MG: 2 INJECTION INTRAMUSCULAR; INTRAVENOUS at 10:29

## 2020-01-01 RX ADMIN — CLONAZEPAM 0.5 MG: 0.5 TABLET ORAL at 09:47

## 2020-01-01 RX ADMIN — HEPARIN SODIUM 5000 UNITS: 5000 INJECTION INTRAVENOUS; SUBCUTANEOUS at 02:28

## 2020-01-01 RX ADMIN — ACETAMINOPHEN 650 MG: 325 TABLET ORAL at 09:45

## 2020-01-01 RX ADMIN — HEPARIN SODIUM 5000 UNITS: 5000 INJECTION INTRAVENOUS; SUBCUTANEOUS at 01:13

## 2020-01-01 RX ADMIN — CLONAZEPAM 0.5 MG: 0.5 TABLET ORAL at 08:14

## 2020-01-01 RX ADMIN — Medication 10 ML: at 14:29

## 2020-01-01 RX ADMIN — SODIUM CHLORIDE 75 ML/HR: 900 INJECTION, SOLUTION INTRAVENOUS at 11:23

## 2020-01-01 RX ADMIN — HEPARIN SODIUM 5000 UNITS: 5000 INJECTION INTRAVENOUS; SUBCUTANEOUS at 17:58

## 2020-01-01 RX ADMIN — Medication 10 ML: at 05:21

## 2020-01-01 RX ADMIN — GLYCOPYRROLATE 0.1 MG: 0.2 INJECTION, SOLUTION INTRAMUSCULAR; INTRAVENOUS at 10:28

## 2020-01-01 RX ADMIN — FLUOXETINE 20 MG: 20 CAPSULE ORAL at 08:57

## 2020-01-01 RX ADMIN — Medication 10 ML: at 22:00

## 2020-01-01 RX ADMIN — BUSPIRONE HYDROCHLORIDE 5 MG: 5 TABLET ORAL at 09:13

## 2020-01-01 RX ADMIN — MORPHINE SULFATE 1 MG: 2 INJECTION, SOLUTION INTRAMUSCULAR; INTRAVENOUS at 10:30

## 2020-01-01 RX ADMIN — FLUOXETINE 20 MG: 20 CAPSULE ORAL at 09:13

## 2020-06-29 PROBLEM — S72.009A HIP FRACTURE (HCC): Status: ACTIVE | Noted: 2020-01-01

## 2020-06-29 PROBLEM — R62.51 FAILURE TO THRIVE (0-17): Status: ACTIVE | Noted: 2020-01-01

## 2020-06-29 NOTE — ROUTINE PROCESS
Patient pleasantly confused, occasionally yells out help. Patient not trying to bet OOB. Trying to reorient patient without success. Patient spoke with daughter on phone.  
 
Elijah Martino RN

## 2020-06-29 NOTE — PROGRESS NOTES
LOUANN:  
1) [facility] 
2) Pt needs negative COVID-19 test and PT/OT consults 3) Attending to address code status and AMD with pt if appropriate 5:40 PM- ROSA has been in contact with pt's family all day regarding long term plans. Initally family wanted pt to go somewhere for 4-5 days and then transfer her to [place] where family is at. CM educated them on options including LTC's and Adult Group Homes, dtr Mattie Lyle declined and is now requesting for pt to go [facility] as she lives 5 minutes down the road. ROSA spoke with William Samaniego in Admissions at [facility]- they are contacting pt's family to arrange. RN Sangeeta Douglas from [facility] came to the ED and assessed pt stating she is appropriate- they will arrange for [facility] services while there. Pt to be brought in under observation- in need of negative COVID-19 test, PT/OT consults. CM will fax William Samaniego at [address] actively working with pt's family stating pt could be admitted Tuesday or Wednesday pending on how quickly family completes everything.  will inform floor CM who will continue to follow and assist as needed. 1:27 PM- ROSA received a phone call from pt's dtr requesting assistance as [facility] will not take pt back. ROSA spoke with He who confirmed this stating pt cannot come to the LTC or SNF side as they do not have beds. Pt's family inquired about hiring 24/7 care until a LTC bed becomes available, Neri declienbakari stating that is not allowed. ORSA requested [place] contact family. 12:28 PM- ROSA also left a HIPPA compliant voicemail for Mat Maxwell (SW) at [facility], waiting on a return phone call 12:24 PM- ROSA notified by attending that pt broke her hip and is in need of a higher level of care then what she is getting at [facility].  ROSA spoke with pt's dtr Mattie Lyle who is requesting to try and get pt back to [facility] but at another level of care, pt's dtr was told last week they did not have availability. CM contacted [facility], left a HIPPA compliant VM for Jl Burton, waiting on a return phone call. Care Management Interventions PCP Verified by CM: Yes Mode of Transport at Discharge: BLS Transition of Care Consult (CM Consult): Discharge Planning MyChart Signup: No 
Discharge Durable Medical Equipment: No 
Health Maintenance Reviewed: Yes Physical Therapy Consult: Yes Occupational Therapy Consult: Yes Speech Therapy Consult: No 
Current Support Network: Assisted Living Confirm Follow Up Transport: Family The Patient and/or Patient Representative was Provided with a Choice of Provider and Agrees with the Discharge Plan?: Yes Name of the Patient Representative Who was Provided with a Choice of Provider and Agrees with the Discharge Plan: Spoke with pt's dtr Marli Booker [employer] of Choice List was Provided with Basic Dialogue that Supports the Patient's Individualized Plan of Care/Goals, Treatment Preferences and Shares the Quality Data Associated with the Providers?: Yes Discharge Location Discharge Placement: Home with [facility] health[facility] BENNIE Baca, CM [facility] [phone]

## 2020-06-29 NOTE — ED PROVIDER NOTES
EMERGENCY DEPARTMENT HISTORY AND PHYSICAL EXAM 
 
 
Date: 6/29/2020 Patient Name: Holly Mon History of Presenting Illness Chief Complaint Patient presents with  
 Hip Pain Patient fell on 6/22. Her mobility has gotten worse so they did an x-ray yesterday and found a hip fracture on the left. History Provided By: Patient, Patient's Daughter and EMS 
 
HPI: Holly Mon, 80 y.o. female presents to the ED with cc of hip fracture. Patient is a resident of an assisted living facility. She has fallen 4 times this month. An x-ray was performed on 6/27, for a fall on 6/22. The x-ray report states that the patient had a subcapital left hip fracture, but that was present on a prior x-ray from June 6. She has not been able to walk according to her daughter. The patient denies any pain. Patient and daughter also state the patient has not had a fever recently, no coughing and no shortness of breath. There are no other complaints, changes, or physical findings at this time. PCP: Teresa, Not On File No current facility-administered medications on file prior to encounter. Current Outpatient Medications on File Prior to Encounter Medication Sig Dispense Refill  losartan (COZAAR) 100 mg tablet Take 100 mg by mouth daily.  metoprolol tartrate (LOPRESSOR) 25 mg tablet Take 25 mg by mouth two (2) times a day.  FLUoxetine (PROzac) 20 mg capsule Take 20 mg by mouth daily.  potassium chloride SR (K-Tab) 10 mEq tablet Take 10 mEq by mouth daily.  levothyroxine (SYNTHROID) 25 mcg tablet Take 25 mcg by mouth Daily (before breakfast).  acetaminophen (TYLENOL) 500 mg tablet Take 500 mg by mouth every six (6) hours as needed for Pain.  lidocaine (LIDODERM) 5 % 1 Patch by TransDERmal route two (2) times a day. Apply patch to the affected area for 12 hours a day and remove for 12 hours a day.  predniSONE (DELTASONE) 50 mg tablet Take 50 mg by mouth daily.  busPIRone (BUSPAR) 5 mg tablet Take 5 mg by mouth three (3) times daily (with meals).  clonazePAM (KlonoPIN) 0.5 mg tablet Take 0.5 mg by mouth daily. Past History Past Medical History: 
Past Medical History:  
Diagnosis Date  Dementia ([address]  Endocrine disease   
 hypothyroidism  Hypertension  Ill-defined condition   
 macular degeneration  Ill-defined condition   
 chronic back pain  Psychiatric disorder   
 anxiety, agitation Past Surgical History: 
Past Surgical History:  
Procedure Laterality Date  HX CHOLECYSTECTOMY Family History: 
History reviewed. No pertinent family history. Social History: 
Social History Tobacco Use  Smoking status: Never Smoker  Smokeless tobacco: Never Used Substance Use Topics  Alcohol use: Not Currently  Drug use: Never Allergies: Allergies Allergen Reactions  Penicillins Unknown (comments) Review of Systems Review of Systems Constitutional: Negative for fever. HENT: Negative for congestion. Eyes: Negative. Respiratory: Negative for shortness of breath. Cardiovascular: Negative for chest pain. Gastrointestinal: Negative for abdominal pain. Endocrine: Negative for heat intolerance. Genitourinary: Negative. Musculoskeletal: Negative for back pain. Skin: Negative for rash. Allergic/Immunologic: Negative for immunocompromised state. Neurological: Negative for dizziness. Hematological: Does not bruise/bleed easily. Psychiatric/Behavioral: Negative. All other systems reviewed and are negative. Physical Exam  
Physical Exam 
Vitals signs and nursing note reviewed. Constitutional:   
   General: She is not in acute distress. Appearance: She is well-developed. HENT:  
   Head: Normocephalic. Neck: Musculoskeletal: Normal range of motion and neck supple. Cardiovascular:  
   Rate and Rhythm: Normal rate and regular rhythm. Heart sounds: Normal heart sounds. Pulmonary:  
   Effort: Pulmonary effort is normal.  
   Breath sounds: Normal breath sounds. Abdominal:  
   General: Bowel sounds are normal.  
   Palpations: Abdomen is soft. Tenderness: There is no abdominal tenderness. Musculoskeletal:  
   Comments: Decreased range of motion left lower extremity Skin: 
   General: Skin is warm and dry. Neurological:  
   General: No focal deficit present. Mental Status: She is alert. Comments: A & O x 2+ Psychiatric:     
   Mood and Affect: Mood normal.     
   Behavior: Behavior normal.  
 
 
 
Diagnostic Study Results Labs - Recent Results (from the past 12 hour(s)) METABOLIC PANEL, COMPREHENSIVE Collection Time: 06/29/20 10:38 AM  
Result Value Ref Range Sodium 137 136 - 145 mmol/L Potassium 4.3 3.5 - 5.1 mmol/L Chloride 105 97 - 108 mmol/L  
 CO2 26 21 - 32 mmol/L Anion gap 6 5 - 15 mmol/L Glucose 102 (H) 65 - 100 mg/dL BUN 23 (H) 6 - 20 MG/DL Creatinine 1.28 (H) 0.55 - 1.02 MG/DL  
 BUN/Creatinine ratio 18 12 - 20 GFR est AA 47 (L) >60 ml/min/1.73m2 GFR est non-AA 38 (L) >60 ml/min/1.73m2 Calcium 8.6 8.5 - 10.1 MG/DL Bilirubin, total 0.4 0.2 - 1.0 MG/DL  
 ALT (SGPT) 25 12 - 78 U/L  
 AST (SGOT) 18 15 - 37 U/L Alk. phosphatase 126 (H) 45 - 117 U/L Protein, total 6.8 6.4 - 8.2 g/dL Albumin 2.7 (L) 3.5 - 5.0 g/dL Globulin 4.1 (H) 2.0 - 4.0 g/dL A-G Ratio 0.7 (L) 1.1 - 2.2    
CBC WITH AUTOMATED DIFF Collection Time: 06/29/20 11:42 AM  
Result Value Ref Range WBC 6.6 3.6 - 11.0 K/uL  
 RBC 4.29 3.80 - 5.20 M/uL  
 HGB 12.2 11.5 - 16.0 g/dL HCT 37.5 35.0 - 47.0 % MCV 87.4 80.0 - 99.0 FL  
 MCH 28.4 26.0 - 34.0 PG  
 MCHC 32.5 30.0 - 36.5 g/dL  
 RDW 14.3 11.5 - 14.5 % PLATELET 878 735 - 083 K/uL MPV 9.5 8.9 - 12.9 FL  
 NRBC 0.0 0  WBC ABSOLUTE NRBC 0.00 0.00 - 0.01 K/uL NEUTROPHILS 54 32 - 75 % LYMPHOCYTES 29 12 - 49 % MONOCYTES 12 5 - 13 % EOSINOPHILS 3 0 - 7 % BASOPHILS 1 0 - 1 % IMMATURE GRANULOCYTES 1 (H) 0.0 - 0.5 % ABS. NEUTROPHILS 3.6 1.8 - 8.0 K/UL  
 ABS. LYMPHOCYTES 1.9 0.8 - 3.5 K/UL  
 ABS. MONOCYTES 0.8 0.0 - 1.0 K/UL  
 ABS. EOSINOPHILS 0.2 0.0 - 0.4 K/UL  
 ABS. BASOPHILS 0.0 0.0 - 0.1 K/UL  
 ABS. IMM. GRANS. 0.1 (H) 0.00 - 0.04 K/UL  
 DF AUTOMATED    
TYPE & SCREEN Collection Time: 06/29/20 12:04 PM  
Result Value Ref Range Crossmatch Expiration 07/02/2020 ABO/Rh(D) O POSITIVE Antibody screen NEG PROTHROMBIN TIME + INR Collection Time: 06/29/20 12:04 PM  
Result Value Ref Range INR 1.0 0.9 - 1.1 Prothrombin time 10.9 9.0 - 11.1 sec Radiologic Studies -  
XR HIP LT W OR WO PELV 2-3 VWS Final Result IMPRESSION:  
  
Acute appearing subcapital left femoral neck fracture with mild displacement. No  
evidence for dislocation CT Results  (Last 48 hours) None CXR Results  (Last 48 hours) None Medical Decision Making I am the first provider for this patient. I reviewed the vital signs, available nursing notes, past medical history, past surgical history, family history and social history. Vital Signs-Reviewed the patient's vital signs. Patient Vitals for the past 12 hrs: 
 Temp Pulse Resp BP SpO2  
06/29/20 1300  85 17 108/58 97 % 06/29/20 1230  79 20 114/50 95 % 06/29/20 1200  70 19 110/43 95 % 06/29/20 1130  72 19 117/50 96 %  
06/29/20 1100  72 15 112/53 96 %  
06/29/20 1030  72 19 111/60 96 %  
06/29/20 1000  69 18 109/52 96 %  
06/29/20 0945  69 19 103/54 95 % 06/29/20 0930  69 18 106/51 93 % 06/29/20 0921 97.9 °F (36.6 °C) 71 17 110/52 93 % 06/29/20 0915  72 20 110/52 95 % Records Reviewed: Nursing Notes and Old Medical Records Provider Notes (Medical Decision Making): Fracture, sprain, contusion ED Course: Initial assessment performed. The patients presenting problems have been discussed, and they are in agreement with the care plan formulated and outlined with them. I have encouraged them to ask questions as they arise throughout their visit. Consult note: 
 
THE REMBERTO physician assistant for orthopedic surgery reviewed the x-ray. The power of  does not want the patient to have surgery. She is aware of the consequences of that decision. Consult note: 
 
Case management tried to place the patient  In a nursing home as opposed to assisted living. They have arranged for her to transfer to the [place] tomorrow. In the meantime, she will need to be admitted for observation and have a negative Covid test result. Consult note: 
 
Patient is being admitted by Dr. Zenobia Baez, hospitalist 
 
  
 
Critical Care Time:  
0 Disposition: 
admit DISCHARGE PLAN: 
1. Current Discharge Medication List  
  
 
2. Follow-up Information None 3. Return to ED if worse Diagnosis Clinical Impression: 1. Closed fracture of left hip, initial encounter ([facility] [address] Attestations: 
 
Jd Espinal MD 
 
Please note that this dictation was completed with [employer], the computer voice recognition software. Quite often unanticipated grammatical, syntax, homophones, and other interpretive errors are inadvertently transcribed by the computer software. Please disregard these errors. Please excuse any errors that have escaped final proofreading. Thank you.

## 2020-06-29 NOTE — PROGRESS NOTES
Pharmacy Clarification of Prior to Admission Medication Regimen The patient was not interviewed regarding clarification of the prior to admission medication regimen. Called [facility] to verify medications and when they were last given. Also questioned regarding use of any other inhalers, topical products, over the counter medications, herbal medications, vitamin products or ophthalmic/nasal/otic medication use. Information Obtained From: Transfer Papers / Saumya Murdock, spoke to Nurse Pertinent Pharmacy Findings: 
Updated patients preferred outpatient pharmacy to: [facility] medication list was corrected to the following:  
 
Prior to Admission Medications Prescriptions Last Dose Informant Taking? FLUoxetine (PROzac) 20 mg capsule 2020 at Unknown time Transfer Papers Yes Sig: Take 20 mg by mouth daily. acetaminophen (TYLENOL) 500 mg tablet 2020 at Unknown time Transfer Papers Yes Sig: Take 500 mg by mouth every six (6) hours as needed for Pain. busPIRone (BUSPAR) 5 mg tablet 2020 at Unknown time Transfer Papers Yes Sig: Take 5 mg by mouth three (3) times daily (with meals). clonazePAM (KlonoPIN) 0.5 mg tablet 2020 at Unknown time Transfer Papers Yes Sig: Take 0.5 mg by mouth daily. levothyroxine (SYNTHROID) 25 mcg tablet 2020 at Unknown time Transfer Papers Yes Sig: Take 25 mcg by mouth Daily (before breakfast). lidocaine (LIDODERM) 5 % 2020 at Unknown time Transfer Papers Yes Si Patch by TransDERmal route two (2) times a day. Apply patch to the affected area for 12 hours a day and remove for 12 hours a day. losartan (COZAAR) 100 mg tablet 2020 at Unknown time Transfer Papers Yes Sig: Take 100 mg by mouth daily. metoprolol tartrate (LOPRESSOR) 25 mg tablet 2020 at Unknown time Transfer Papers Yes Sig: Take 25 mg by mouth two (2) times a day. potassium chloride SR (K-Tab) 10 mEq tablet 6/28/2020 at Unknown time Transfer Papers Yes Sig: Take 10 mEq by mouth daily. predniSONE (DELTASONE) 50 mg tablet 6/28/2020 at Unknown time Transfer Papers Yes Sig: Take 50 mg by mouth daily. Facility-Administered Medications: None Thank you, 
Tr Bauer CPHT Medication History Technician

## 2020-06-29 NOTE — CONSULTS
Ortho: 
Multiple fall over the last month-- Pt had been using a cane prior to fall early June 2 views of the left hip COMPARISON: None History: Trauma, left hip pain FINDINGS:  
Acute appearing subcapital left femoral neck fracture with mild displacement. No 
other fracture seen. No evidence for dislocation. IMPRESSION: Acute appearing subcapital left femoral neck fracture with mild displacement. No evidence for dislocation Evidently she had xrays done June 6th after [facility] that demonstrated similar fracture 
  
Pt's Daughter/ POA does NOT want any surgical intervention PT should ambulate with walker to help off-load the LLE Please re-consult as needed Please consider office FU in two weeks for re-xray Plan per Dr Ferguson Player Natasha Washington PA-C

## 2020-06-29 NOTE — H&P
Hospitalist Admission Note NAME: Danny Willis :  1921 MRN:  [id number] Date/Time:  2020 5:13 PM 
 
Patient PCP: Jakob Barnett, Not On File 
______________________________________________________________________ Given the patient's current clinical presentation, I have a high level of concern for decompensation if discharged from the emergency department. Complex decision making was performed, which includes reviewing the patient's available past medical records, laboratory results, and x-ray films. My assessment of this patient's clinical condition and my plan of care is as follows. Assessment / Plan: 
 
Multiple falls, POA Acute appearing subcapital left femoral neck fracture with mild displacement, POA  Presented with falls  Was found to have left femoral neck fracture  X-ray of the left hip revealed Acute appearing subcapital left femoral neck fracture with mild displacement. No 
evidence for dislocation Orthopedic eval with patient the ED 
Daughter who is medical power of  refused any surgical intervention at this time  saw the patient at the [facility] hopefully tomorrow Bedrest 
 
Acute kidney injury, POA Unknown baseline creatinine  Hold losartan and other nephrotoxic agents Follow-up BMP daily Hypertension  Continue metoprolol Hold losartan due to above Baseline dementia Not in any medications Hypothyroidism Continue home Synthroid Anxiety  Continue home meds Code Status: DNR Surrogate Decision Maker: Her daughter DVT Prophylaxis: Heparin GI Prophylaxis: not indicated Baseline: Functional  
  
Subjective: CHIEF COMPLAINT: Falls HISTORY OF PRESENT ILLNESS:    
 
The patient is a 80years old woman with past medical history dementia, hypothyroidism, hypertension presented emergency department with multiple falls.   He was brought by her daughter who is the medical power of . She reported that she has been falling for the last month multiple times. Patient was found to have left hip femoral fracture and orthopedic surgery evaluate the patient in the ED however daughter refused all surgical intervention at this time.  saw the patient also in the emergency department arrange for placement tomorrow. We were asked to admit for work up and evaluation of the above problems. Past Medical History:  
Diagnosis Date  Dementia ([address]  Endocrine disease   
 hypothyroidism  Hypertension  Ill-defined condition   
 macular degeneration  Ill-defined condition   
 chronic back pain  Psychiatric disorder   
 anxiety, agitation Past Surgical History:  
Procedure Laterality Date  HX CHOLECYSTECTOMY Social History Tobacco Use  Smoking status: Never Smoker  Smokeless tobacco: Never Used Substance Use Topics  Alcohol use: Not Currently History reviewed. No pertinent family history. Allergies Allergen Reactions  Penicillins Unknown (comments) Prior to Admission medications Medication Sig Start Date End Date Taking? Authorizing Provider  
losartan (COZAAR) 100 mg tablet Take 100 mg by mouth daily. Yes Other, MD Maria Teresa  
metoprolol tartrate (LOPRESSOR) 25 mg tablet Take 25 mg by mouth two (2) times a day. Yes Marino, MD Maria Teresa  
FLUoxetine (PROzac) 20 mg capsule Take 20 mg by mouth daily. Yes Other, MD Maria Teresa  
potassium chloride SR (K-Tab) 10 mEq tablet Take 10 mEq by mouth daily. Yes Marino, MD Maria Teresa  
levothyroxine (SYNTHROID) 25 mcg tablet Take 25 mcg by mouth Daily (before breakfast). Yes Other, MD Maria Teresa  
acetaminophen (TYLENOL) 500 mg tablet Take 500 mg by mouth every six (6) hours as needed for Pain. Yes Other, MD Maria Teresa  
lidocaine (LIDODERM) 5 % 1 Patch by TransDERmal route two (2) times a day.  Apply patch to the affected area for 12 hours a day and remove for 12 hours a day. Yes Marino, MD Maria Teresa  
predniSONE (DELTASONE) 50 mg tablet Take 50 mg by mouth daily. Yes Maria Teresa Pichardo MD  
busPIRone (BUSPAR) 5 mg tablet Take 5 mg by mouth three (3) times daily (with meals). Yes Marino, MD Maria Teresa  
clonazePAM (KlonoPIN) 0.5 mg tablet Take 0.5 mg by mouth daily. Yes Marino, MD Maria Teresa  
 
 
REVIEW OF SYSTEMS:    
I am not able to complete the review of systems because: The patient is intubated and sedated The patient has altered mental status due to his acute medical problems The patient has baseline aphasia from prior stroke(s)  
x The patient has baseline dementia and is not reliable historian The patient is in acute medical distress and unable to provide information Total of 12 systems reviewed as follows:   
   POSITIVE= underlined text  Negative = text not underlined General:  fever, chills, sweats, generalized weakness, weight loss/gain,  
   loss of appetite Eyes:    blurred vision, eye pain, loss of vision, double vision ENT:    rhinorrhea, pharyngitis Respiratory:   cough, sputum production, SOB, TRISTAN, wheezing, pleuritic pain  
Cardiology:   chest pain, palpitations, orthopnea, PND, edema, syncope Gastrointestinal:  abdominal pain , N/V, diarrhea, dysphagia, constipation, bleeding Genitourinary:  frequency, urgency, dysuria, hematuria, incontinence Muskuloskeletal :  arthralgia, myalgia, back pain Hematology:  easy bruising, nose or gum bleeding, lymphadenopathy Dermatological: rash, ulceration, pruritis, color change / jaundice Endocrine:   hot flashes or polydipsia Neurological:  headache, dizziness, confusion, focal weakness, paresthesia, Speech difficulties, memory loss, gait difficulty Psychological: Feelings of anxiety, depression, agitation Objective: VITALS:   
Visit Vitals /54 Pulse 84 Temp 98.4 °F (36.9 °C) Resp 14 Ht 5' 4.5\" (1.638 m) Wt 58.7 kg (129 lb 6.6 oz) SpO2 95% BMI 21.87 kg/m² PHYSICAL EXAM: 
 
General:    Alert, cooperative, no distress, appears stated age. HEENT: Atraumatic, anicteric sclerae, pink conjunctivae No oral ulcers, mucosa moist, throat clear, dentition fair Neck:  Supple, symmetrical,  thyroid: non tender Lungs:   Clear to auscultation bilaterally. No Wheezing or Rhonchi. No rales. Chest wall:  No tenderness  No Accessory muscle use. Heart:   Regular  rhythm,  No  murmur   No edema Abdomen:   Soft, non-tender. Not distended. Bowel sounds normal 
Extremities: No cyanosis. No clubbing,   
  Skin turgor normal, Capillary refill normal, Radial dial pulse 2+ Skin:     Not pale. Not Jaundiced  No rashes Psych:  Poor insight. Not depressed. Not anxious or agitated. Neurologic: EOMs intact. No facial asymmetry. No aphasia or slurred speech. Symmetrical strength, Sensation grossly intact. Alert and oriented X 1(to person only)  
 
_______________________________________________________________________ Care Plan discussed with: 
  Comments Patient x Family RN x Care Manager Consultant:     
_______________________________________________________________________ Expected  Disposition:  
Home with Family HH/PT/OT/RN   
SNF/LTC x  
RACHEL   
________________________________________________________________________ TOTAL TIME:  55 Minutes Critical Care Provided     Minutes non procedure based Comments  
 x Reviewed previous records  
>50% of visit spent in counseling and coordination of care x Discussion with patient and/or family and questions answered 
  
 
________________________________________________________________________ Signed: Leonie Plascencia MD 
 
Procedures: see electronic medical records for all procedures/Xrays and details which were not copied into this note but were reviewed prior to creation of Plan. LAB DATA REVIEWED:   
Recent Results (from the past 24 hour(s)) METABOLIC PANEL, COMPREHENSIVE  
 Collection Time: 06/29/20 10:38 AM  
Result Value Ref Range Sodium 137 136 - 145 mmol/L Potassium 4.3 3.5 - 5.1 mmol/L Chloride 105 97 - 108 mmol/L  
 CO2 26 21 - 32 mmol/L Anion gap 6 5 - 15 mmol/L Glucose 102 (H) 65 - 100 mg/dL BUN 23 (H) 6 - 20 MG/DL Creatinine 1.28 (H) 0.55 - 1.02 MG/DL  
 BUN/Creatinine ratio 18 12 - 20 GFR est AA 47 (L) >60 ml/min/1.73m2 GFR est non-AA 38 (L) >60 ml/min/1.73m2 Calcium 8.6 8.5 - 10.1 MG/DL Bilirubin, total 0.4 0.2 - 1.0 MG/DL  
 ALT (SGPT) 25 12 - 78 U/L  
 AST (SGOT) 18 15 - 37 U/L Alk. phosphatase 126 (H) 45 - 117 U/L Protein, total 6.8 6.4 - 8.2 g/dL Albumin 2.7 (L) 3.5 - 5.0 g/dL Globulin 4.1 (H) 2.0 - 4.0 g/dL A-G Ratio 0.7 (L) 1.1 - 2.2    
CBC WITH AUTOMATED DIFF Collection Time: 06/29/20 11:42 AM  
Result Value Ref Range WBC 6.6 3.6 - 11.0 K/uL  
 RBC 4.29 3.80 - 5.20 M/uL  
 HGB 12.2 11.5 - 16.0 g/dL HCT 37.5 35.0 - 47.0 % MCV 87.4 80.0 - 99.0 FL  
 MCH 28.4 26.0 - 34.0 PG  
 MCHC 32.5 30.0 - 36.5 g/dL  
 RDW 14.3 11.5 - 14.5 % PLATELET 616 308 - 011 K/uL MPV 9.5 8.9 - 12.9 FL  
 NRBC 0.0 0  WBC ABSOLUTE NRBC 0.00 0.00 - 0.01 K/uL NEUTROPHILS 54 32 - 75 % LYMPHOCYTES 29 12 - 49 % MONOCYTES 12 5 - 13 % EOSINOPHILS 3 0 - 7 % BASOPHILS 1 0 - 1 % IMMATURE GRANULOCYTES 1 (H) 0.0 - 0.5 % ABS. NEUTROPHILS 3.6 1.8 - 8.0 K/UL  
 ABS. LYMPHOCYTES 1.9 0.8 - 3.5 K/UL  
 ABS. MONOCYTES 0.8 0.0 - 1.0 K/UL  
 ABS. EOSINOPHILS 0.2 0.0 - 0.4 K/UL  
 ABS. BASOPHILS 0.0 0.0 - 0.1 K/UL  
 ABS. IMM. GRANS. 0.1 (H) 0.00 - 0.04 K/UL  
 DF AUTOMATED    
TYPE & SCREEN Collection Time: 06/29/20 12:04 PM  
Result Value Ref Range Crossmatch Expiration 07/02/2020 ABO/Rh(D) O POSITIVE Antibody screen NEG PROTHROMBIN TIME + INR Collection Time: 06/29/20 12:04 PM  
Result Value Ref Range INR 1.0 0.9 - 1.1 Prothrombin time 10.9 9.0 - 11.1 sec SARS-COV-2  
 Collection Time: 06/29/20  4:24 PM  
Result Value Ref Range Specimen source Nasopharyngeal    
 SARS-CoV-2 PENDING  Specimen source Nasopharyngeal

## 2020-06-29 NOTE — ED NOTES
Case Management states the pt will be admitted under observation, and will be transferred to [facility] facility tomorrow.  Case Management states the pt needs a negative COVID19 test.

## 2020-06-29 NOTE — ED NOTES
TRANSFER - OUT REPORT: 
 
Verbal report given to CHANTEL Hobbs (name) on Monica Kaur  being transferred to Ortho., RN (unit) for routine progression of care Report consisted of patients Situation, Background, Assessment and  
Recommendations(SBAR). Information from the following report(s) SBAR was reviewed with the receiving nurse. Lines:    
 
Opportunity for questions and clarification was provided.

## 2020-06-29 NOTE — ROUTINE PROCESS
Bedside and Verbal shift change report given to Niru Graham  (oncoming nurse) by Jonathan Roland RN (offgoing nurse). Report included the following information SBAR, Kardex, Intake/Output, MAR and Recent Results.

## 2020-06-30 NOTE — PROGRESS NOTES
LOUANN 1) [facility] 
2) pending [facility] new admission paperwork completion by MD LEE obtained [facility] new admission paper work. ROSA has informed MD who requested paperwork be placed in patient's bedside chart. MD agreed to complete paperwork for admission. George RushLens, [facility]

## 2020-06-30 NOTE — PROGRESS NOTES
0700- assumed care of pt this am. Pt is alert but confused and yells out constantly for help. Per report pt pulled out several IV's overnight. 1000- pt has pulled both her IV's, pt placed in bilateral mitts, MD notified, order placed, family called. 1100- pt has pulled off mitts several times, attempting to get out of bed. MD has ordered a sitter and soft wrist restraints if needed. Family has been updated. PRN medication ordered. 1122- medication given. [id number] - OUT REPORT: 
 
Verbal report given to Karoline(name) on [facility]  being transferred [id number](unit) for routine progression of care Report consisted of patients Situation, Background, Assessment and  
Recommendations(SBAR). Information from the following report(s) SBAR, Kardex and MAR was reviewed with the receiving nurse. Opportunity for questions and clarification was provided. Patient transported with: 
 Frilp

## 2020-06-30 NOTE — PROGRESS NOTES
Bedside shift change report given to CHANTEL Solis (oncoming nurse) by Pramod Curiel (offgoing nurse). Report included the following information SBAR, Kardex, Procedure Summary, Intake/Output, MAR and Recent Results.

## 2020-06-30 NOTE — PROGRESS NOTES
Bedside and Verbal shift change report given to Godwin Montes RN (oncoming nurse) by Jovani Soares RN (offgoing nurse). Report included the following information SBAR, Kardex, Intake/Output and MAR.

## 2020-06-30 NOTE — ROUTINE PROCESS
Patient yelling at RN that she's going to jamila him because he's a nurse helping her. Also proclaimed that I (male RN) was there to hurt her like the others. Will speak to other nurses and transfer care.

## 2020-06-30 NOTE — PROGRESS NOTES
Hospitalist Progress Note NAME: Rachid Sheets :  1921 MRN:  [id number] Assessment / Plan: 
Multiple falls, POA Acute appearing subcapital left femoral neck fracture with mild displacement, POA Covid19 negative. X-ray of the left hip revealed acute appearing subcapital left femoral neck fracture witha mild displacement. No evidence for dislocation. This was discussed with pt's daughter on admission and daughter who is medical power of  refused any surgical intervention at this time. Ortho evaluated pt in the ER. recommended ambulate with walker to help off load the LLE. FU in 2 weeks for repeat XR. 
  
Acute kidney injury, POA 
HTN Unknown baseline creatinine but cr is improving with IVF. Will cont' until pt can demonstrate good PO intake. Will discontinue losartan. Cont' low dose BB. Will accept higher BP due to advance age and high fall risks. 
  
Baseline dementia Pt is very confused and agitated right now. Pulling at IVs, high risk for falls Will add haldol prn Sitter prn 
  
Hypothyroidism Continue home Synthroid 
  
Anxiety Continue home meds 
   
Pt on prednisone for unclear reason. Daughter states she thinks pt is on it for recent inflammation at [facility]? Will need to taper Code Status: DNR Surrogate Decision Maker: Her daughter  
DVT Prophylaxis: Heparin GI Prophylaxis: not indicated  
Baseline: Functional  
 
Subjective: Chief Complaint / Reason for Physician Visit Pt seen, very agitated, confused. Daughter states this has been ongoing for over 3 weeks since pt has been at [facility]. Discussed with RN events overnight. Review of Systems: 
Symptom Y/N Comments  Symptom Y/N Comments Fever/Chills    Chest Pain Poor Appetite    Edema Cough    Abdominal Pain Sputum    Joint Pain SOB/TRISTAN    Pruritis/Rash Nausea/vomit    Tolerating PT/OT Diarrhea    Tolerating Diet Constipation    Other Could NOT obtain due to: dementia Objective: VITALS:  
Last 24hrs VS reviewed since prior progress note. Most recent are: 
Patient Vitals for the past 24 hrs: 
 Temp Pulse Resp BP SpO2  
06/30/20 1129 97.7 °F (36.5 °C) 88 18 151/62   
06/30/20 0746 97.7 °F (36.5 °C) 73 18 124/53   
06/30/20 0400 97.7 °F (36.5 °C) 78 18 137/71 97 % 06/29/20 2346 98.6 °F (37 °C) 80 18 137/68 96 %  
06/29/20 1725 98.4 °F (36.9 °C) 85 16 110/55 100 % 06/29/20 1601 98.4 °F (36.9 °C) 84 14 137/54 95 % 06/29/20 1534  90 23 133/56 92 %  
06/29/20 1300  85 17 108/58 97 % 06/29/20 1230  79 20 114/50 95 % Intake/Output Summary (Last 24 hours) at 6/30/2020 1212 Last data filed at 6/29/2020 2032 Gross per 24 hour Intake 225 ml Output  Net 225 ml PHYSICAL EXAM: 
General: Elderly female in bed, confused, pulling at IVs 
EENT:  EOMI. Anicteric sclerae. MMM Resp:  CTA bilaterally, no wheezing or rales. No accessory muscle use CV:  Regular  rhythm,  No edemaNeurologic:  AAOx0, confused, agitated GI:  Soft, Non distended, Non tender.  +Bowel sounds Skin:  No rashes. No jaundice Reviewed most current lab test results and cultures  YES Reviewed most current radiology test results   YES Review and summation of old records today    NO Reviewed patient's current orders and MAR    YES 
PMH/SH reviewed - no change compared to H&P 
________________________________________________________________________ Care Plan discussed with: 
  Comments Patient x Family  x daughter RN x Care Manager Consultant Multidiciplinary team rounds were held today with , nursing, pharmacist and clinical coordinator. Patient's plan of care was discussed; medications were reviewed and discharge planning was addressed. ________________________________________________________________________ Total NON critical care TIME:  35   Minutes Total CRITICAL CARE TIME Spent:   Minutes non procedure based Comments >50% of visit spent in counseling and coordination of care    
________________________________________________________________________ Eugenie Cortez MD  
 
Procedures: see electronic medical records for all procedures/Xrays and details which were not copied into this note but were reviewed prior to creation of Plan. LABS: 
I reviewed today's most current labs and imaging studies. Pertinent labs include: 
Recent Labs  
  06/29/20 2350 06/29/20 
1142 WBC 7.0 6.6 HGB 11.5 12.2 HCT 34.6* 37.5  251 Recent Labs  
  06/29/20 
2350 06/29/20 
1204 06/29/20 
1038   --  137  
K 4.1  --  4.3   --  105 CO2 22  --  26 *  --  102* BUN 20  --  23* CREA 0.91  --  1.28* CA 8.4*  --  8.6 ALB 2.6*  --  2.7* TBILI 0.4  --  0.4 ALT 24  --  25 INR  --  1.0  --   
 
 
Signed: Eugenie Cortez MD

## 2020-06-30 NOTE — PROGRESS NOTES
TRANSFER - IN REPORT: 
 
Verbal report received from CHANTEL Houston(name) on Adam  being received from [facility](unit) for routine progression of care Report consisted of patients Situation, Background, Assessment and  
Recommendations(SBAR). Information from the following report(s) SBAR, Kardex, Procedure Summary, Intake/Output, MAR and Recent Results was reviewed with the receiving nurse. Opportunity for questions and clarification was provided. Assessment completed upon patients arrival to unit and care assumed. Patient negative covid. Advanced dementia, agitation. Sitter at bedside as ordered.

## 2020-06-30 NOTE — PROGRESS NOTES
Care of patient transferred to [facility]. Full report given and updated on threats.

## 2020-06-30 NOTE — PROGRESS NOTES
OT referral received and chart reviewed. Patient does not have a PT order and will need one. Spoke with Ortho NP to request clarification of LLE WB status as well. Per discussion with nursing the patient is only oriented to self, and is extremely confused and impulsive at this time. Will defer OT evaluation for now and follow back tomorrow.

## 2020-07-01 NOTE — PROGRESS NOTES
Hospitalist Progress Note NAME: Иван Roblero :  1921 MRN:  [id number] Assessment / Plan: 
Multiple falls, POA Acute appearing subcapital left femoral neck fracture with mild displacement, POA Covid19 negative. X-ray of the left hip revealed acute appearing subcapital left femoral neck fracture witha mild displacement. No evidence for dislocation. This was discussed with pt's daughter on admission and daughter who is medical power of  refused any surgical intervention at this time. Ortho evaluated pt in the ER. recommended ambulate with walker to help off load the LLE. FU in 2 weeks for repeat XR. 
  
Acute kidney injury, POA 
HTN Unknown baseline creatinine but cr is improving with IVF. Will cont' until pt can demonstrate good PO intake. Will discontinue losartan. Cont' low dose BB. Will accept higher BP due to advance age and high fall risks. 
  
Baseline dementia Pt is very confused and agitated right now. Pulling at IVs, high risk for falls Will add haldol prn Sitter prn 
  
Hypothyroidism Continue home Synthroid 
  
Anxiety Continue home meds 
   
Pt on prednisone for unclear reason. Daughter states she thinks pt is on it for recent inflammation at [facility]? Will need to taper Code Status: DNR Surrogate Decision Maker: Her daughter  
DVT Prophylaxis: Heparin GI Prophylaxis: not indicated  
Baseline: Functional  
 
Subjective: Chief Complaint / Reason for Physician Visit Pt seen. No new complaint. Discussed with RN events overnight. Review of Systems: 
Symptom Y/N Comments  Symptom Y/N Comments Fever/Chills    Chest Pain Poor Appetite    Edema Cough    Abdominal Pain Sputum    Joint Pain SOB/TRISTAN    Pruritis/Rash Nausea/vomit    Tolerating PT/OT Diarrhea    Tolerating Diet Constipation    Other Could NOT obtain due to: dementia Objective: VITALS:  
 Last 24hrs VS reviewed since prior progress note. Most recent are: 
Patient Vitals for the past 24 hrs: 
 Temp Pulse Resp BP SpO2  
07/01/20 0732 98.3 °F (36.8 °C) 84 18 136/72 98 % 07/01/20 0345 97.4 °F (36.3 °C) 82 18 137/83 97 % 06/30/20 1919 97.7 °F (36.5 °C) 74 18 152/65 99 % 06/30/20 1518 97.9 °F (36.6 °C) 78 15 143/86 98 % 06/30/20 1223 98.3 °F (36.8 °C) 72 18 143/48   
06/30/20 1129 97.7 °F (36.5 °C) 88 18 151/62  Intake/Output Summary (Last 24 hours) at 7/1/2020 2350 Last data filed at 7/1/2020 5190 Gross per 24 hour Intake  Output 200 ml Net -200 ml PHYSICAL EXAM: 
General: Elderly female in bed, confused EENT:  EOMI. Anicteric sclerae. MMM Resp:  CTA bilaterally, no wheezing or rales. No accessory muscle use CV:  Regular  rhythm,  No edemaNeurologic:   
Neuro:  AAOx0, confused, agitated GI:  Soft, Non distended, Non tender.  +Bowel sounds Skin:  No rashes. No jaundice Reviewed most current lab test results and cultures  YES Reviewed most current radiology test results   YES Review and summation of old records today    NO Reviewed patient's current orders and MAR    YES 
PMH/ reviewed - no change compared to H&P 
________________________________________________________________________ Care Plan discussed with: 
  Comments Patient x Family RN x Care Manager Consultant Multidiciplinary team rounds were held today with , nursing, pharmacist and clinical coordinator. Patient's plan of care was discussed; medications were reviewed and discharge planning was addressed. ________________________________________________________________________ Total NON critical care TIME:  35   Minutes Total CRITICAL CARE TIME Spent:   Minutes non procedure based Comments >50% of visit spent in counseling and coordination of care    
________________________________________________________________________ Pollo Guzmán MD  
 
Procedures: see electronic medical records for all procedures/Xrays and details which were not copied into this note but were reviewed prior to creation of Plan. LABS: 
I reviewed today's most current labs and imaging studies. Pertinent labs include: 
Recent Labs  
  06/29/20 2350 06/29/20 
1142 WBC 7.0 6.6 HGB 11.5 12.2 HCT 34.6* 37.5  251 Recent Labs  
  06/29/20 
2350 06/29/20 
1204 06/29/20 
1038   --  137  
K 4.1  --  4.3   --  105 CO2 22  --  26 *  --  102* BUN 20  --  23* CREA 0.91  --  1.28* CA 8.4*  --  8.6 ALB 2.6*  --  2.7* TBILI 0.4  --  0.4 ALT 24  --  25 INR  --  1.0  --   
 
 
Signed: Pollo Guzmán MD

## 2020-07-01 NOTE — ROUTINE PROCESS
Bedside and Verbal shift change report [facility] ([facility] nurse) by Carine Mcmahan (offgoing nurse). Report included the following information SBAR and Kardex.

## 2020-07-01 NOTE — PROGRESS NOTES
Problem: Self Care Deficits Care Plan (Adult) Goal: *Acute Goals and Plan of Care (Insert Text) Description:  
 
FUNCTIONAL STATUS PRIOR TO ADMISSION: Prior level of function is unclear, Apparently she has been ambulatory, but has had multiple falls. HOME SUPPORT: Patient recently residing in [address] [place] [address] Occupational Therapy Goals Initiated 7/1/2020 1. Patient will perform grooming seated EOB with supervision/set-up within 7 day(s). 2.  Patient will perform upper body dressing with moderate assistance  within 7 day(s). 3.  Patient will perform lower body dressing with moderate assistance  within 7 day(s). 4.  Patient will perform BSC transfers with minimal assistance/contact guard assist within 7 day(s). 5.  Patient will perform all aspects of toileting with moderate assistance  within 7 day(s). 6.  Patient will perform sponge bathing with moderate assistance  within 7 day(s). Outcome: Progressing Towards Goal 
  
OCCUPATIONAL THERAPY EVALUATION Patient: Reggie Barbosa (4 y.o. female) Date: 7/1/2020 Primary Diagnosis: Hip fracture ([address] [place] Failure to thrive (0-17) [R62.51] Precautions:NWB(LLE) ASSESSMENT Based on the objective data described below, the patient presents with significant cognitive impairments 2/2 baseline dementia, LLE NWB precautions, GW, decreased activity tolerance, decreased safety awareness and decreased balance which is impairing her functional performance. The patient's functional baseline is unknown, but she was apparently ambulatory with a h/o multiple falls. At present she is performing ADLs at a min A to max A level and is max A of 1 to mod A of 2 for functional mobility. The patient will benefit from acute OT intervention and will need penitentiary/LTC placement after discharge. Functional Outcome Measure: The patient scored 15/100 on the Barthel Index outcome measure which is indicative of an 85% impairment in function.    
   
 
PLAN : 
 Recommendations and Planned Interventions: self care training, functional mobility training, therapeutic exercise, balance training, therapeutic activities, endurance activities, patient education, home safety training and family training/education Frequency/Duration: Patient will be followed by occupational therapy 4 times a week to address goals. Recommendation for discharge: (in order for the patient to meet his/her long term goals) [facility]/LTC Equipment recommendations for successful discharge (if) home: walker: rolling, WC and BSC OBJECTIVE DATA SUMMARY:  
HISTORY:  
Past Medical History:  
Diagnosis Date  Dementia ([facility] [address]  Endocrine disease   
 hypothyroidism  Hypertension  Ill-defined condition   
 macular degeneration  Ill-defined condition   
 chronic back pain  Psychiatric disorder   
 anxiety, agitation Past Surgical History:  
Procedure Laterality Date  HX CHOLECYSTECTOMY Expanded or extensive additional review of patient history:  
 
Home Situation Home Environment: Long term care One/Two Story Residence: One story Living Alone: No 
Support Systems: Assisted living, Friends \ neighbors, Family member(s) Patient Expects to be Discharged to[de-identified] Skilled nursing facility Current DME Used/Available at Home: None Hand dominance: Right EXAMINATION OF PERFORMANCE DEFICITS: 
Cognitive/Behavioral Status: 
Neurologic State: Alert;Confused(tends to keep her eyes closed) Orientation Level: Oriented to person;Disoriented to place; Disoriented to situation;Disoriented to time Cognition: Decreased attention/concentration;Decreased command following; Impaired decision making; Impulsive;Poor safety awareness Safety/Judgement: Decreased awareness of environment;Decreased awareness of need for assistance;Decreased awareness of need for safety; Lack of insight into deficits Hearing: Auditory Auditory Impairment: Hard of hearing, bilateral 
 
 Vision/Perceptual:   
    
  
 
Range of Motion: 
AROM: Generally decreased, functional 
 
Strength: 
Strength: Generally decreased, functional 
 
Coordination: Tone & Sensation: 
Tone: Normal 
  
  
 
Balance: 
Sitting: Impaired Sitting - Static: Good (unsupported) Sitting - Dynamic: Fair (occasional) Standing: Impaired; With support Standing - Static: Constant support;Poor Standing - Dynamic : Constant support;Poor Functional Mobility and Transfers for ADLs: 
Bed Mobility: 
Rolling: Maximum assistance;Assist x1 Supine to Sit: Maximum assistance;Assist x1 Sit to Supine: Moderate assistance;Assist x2 Scooting: Maximum assistance;Assist x1 Transfers: 
Sit to Stand: Moderate assistance;Assist x2 Stand to Sit: Moderate assistance;Assist x2 Toilet Transfer : Moderate assistance;Assist x2(standing and taking stepswith support of RW to [facility]) ADL Assessment: 
Feeding: Minimum assistance Oral Facial Hygiene/Grooming: Minimum assistance Bathing: Maximum assistance Upper Body Dressing: Maximum assistance Lower Body Dressing: Maximum assistance Toileting: Maximum assistance ADL Intervention and task modifications: 
Grooming Position Performed: (in bed with HOB raised) Washing Face: Supervision;Set-up Washing Hands: Supervision;Set-up Cues: Verbal cues provided; Tactile cues provided;Visual cues provided Toileting Toileting Assistance: Maximum assistance Bladder Hygiene: Supervision;Set-up Bowel Hygiene: Maximum assistance Clothing Management: Maximum assistance Cues: Tactile cues provided;Verbal cues provided;Visual cues provided Cognitive Retraining Organizing/Sequencing: Breaking task down; Two step sequence Attention to Task: Distractibility; Single task Following Commands: Follows one step commands/directions Safety/Judgement: Decreased awareness of environment;Decreased awareness of need for assistance;Decreased awareness of need for safety; Lack of insight into deficits Functional Measure: 
Barthel Index: 
 
Bathin Bladder: 5 Bowels: 5 Groomin Dressin Feedin Mobility: 0 Stairs: 0 Toilet Use: 0 Transfer (Bed to Chair and Back): 5 Total: 15/100 Percentage of impairment  
0% 1-19% 20-39% 40-59% 60-79% 80-99% 100% Barthel Score 0-100 100 99-80 79-60 59-40 20-39 1-19 
 0 The Barthel ADL Index: Guidelines 1. The index should be used as a record of what a patient does, not as a record of what a patient could do. 2. The main aim is to establish degree of independence from any help, physical or verbal, however minor and for whatever reason. 3. The need for supervision renders the patient not independent. 4. A patient's performance should be established using the best available evidence. Asking the patient, friends/relatives and nurses are the usual sources, but direct observation and common sense are also important. However direct testing is not needed. 5. Usually the patient's performance over the preceding 24-48 hours is important, but occasionally longer periods will be relevant. 6. Middle categories imply that the patient supplies over 50 per cent of the effort. 7. Use of aids to be independent is allowed. Alexandro Keen., Barthel, D.W. (3658). Functional evaluation: the Barthel Index. [address] [facility] (14)2. KEELEY Caraballo, Jessica Finley, Genia Bennett, [address] (). Measuring the change indisability after inpatient rehabilitation; comparison of the responsiveness of the Barthel Index and Functional [place] Measure. Journal of Neurology, Neurosurgery, and Psychiatry, 66(4), 658-067. Lendon Soulier, N.J.A, MAURICE Kendrick.ANA LUISA, & Dat Benoit MMichaelA. (2004.) Assessment of post-stroke quality of life in cost-effectiveness studies: The usefulness of the Barthel Index and the EuroQoL-5D.  Quality of Life Research, 13, 555-92 Pain Rating: No complaint of pain Activity Tolerance:  
WNL and Fair Please refer to the flowsheet for vital signs taken during this treatment. After treatment patient left in no apparent distress:   
in bed with bed in chair position, call bell in reach an sitter present COMMUNICATION/EDUCATION:  
The patients plan of care was discussed with: Physical Therapist and Registered Nurse. Patient is unable to participate in goal setting and plan of care. This patients plan of care is appropriate for delegation to [facility]. Thank you for this referral. 
Joby Perkins OTR/L Time Calculation: 25 mins

## 2020-07-01 NOTE — PHYSICIAN ADVISORY
Letter of Status Determination:  
Recommend hospitalization status upgraded from OBSERVATION  to INPATIENT  Status Pt Name:  Latosha Cedillo MR#  
BILLIE # [id number] / 
[id number] Payor: VeedMeaura Ly / Plan: Via [employer] / Product Type: Managed Care Medicare /   
MOUNIKA#  [id number] Room and Hospital  3219/01  @ [facility] Hospitalization date  6/29/2020  8:59 AM  
Current Attending Physician  Luba Esquivel MD  
Principal diagnosis  Hip pain Clinicals  The patient is a 80years old woman with past medical history dementia, hypothyroidism, hypertension presented emergency department with multiple falls. Pt w/ acute left femoral neck fracture, Pt w/ MAR, Pt w/ confusion, family decides no acute surgical intervention, Pt with dehydration, difficulty in ambulating [place] (MCG) criteria ORG: S-600 ([address] [place] [address]) STATUS DETERMINATION  Inpatient The final decision of the patient's hospitalization status depends on the attending physician's judgment Additional comments Payor: Rohini Ly / Plan: Via [employer] / Product Type: 2[employer] Care Medicare /   
  
 
Ember Mckeon MD 
Cell: [phone] Physician Advisor

## 2020-07-01 NOTE — PROGRESS NOTES
Bedside shift change report given to Radha Hauser RN (oncoming nurse) by Esme Dougherty RN (offgoing nurse). Report included the following information SBAR, OR Summary, Intake/Output, MAR and Recent Results.

## 2020-07-01 NOTE — CONSULTS
Consulted completed by Yves Ojeda - non operative management as per pts daughter [facility]) request. Please see Anne's full note

## 2020-07-01 NOTE — PROGRESS NOTES
Ortho Progress Note: 
 
Pt resting in bed, per nursing pt was up last night and is confused Daughter has elected to not do surgery for L subcapital femoral neck fracture Pt is confused with sitter this AM 
LLE - in position of comfort this AM, +ROM, NVI Plan: May work with PT/OT - limit WB as much as possible, ultimately NWB would be best for the pt which could be difficult for pt since she has dementia Pain management as ordered Re-consult as needed Quynh Arellano, AUSTINP-BC Orthopedic Trauma Team [id number]

## 2020-07-01 NOTE — PROGRESS NOTES
Problem: Mobility Impaired (Adult and Pediatric) Goal: *Acute Goals and Plan of Care (Insert Text) Description: FUNCTIONAL STATUS PRIOR TO ADMISSION: Patient unable to provide PLOF secondary to confusion. HOME SUPPORT PRIOR TO ADMISSION: Patient was living in [place] per chart. Physical Therapy Goals Initiated 7/1/2020 1. Patient will move from supine to sit and sit to supine  in bed with minimal assistance/contact guard assist within 7 day(s). 2.  Patient will transfer from bed to chair and chair to bed with minimal assistance/contact guard assist using the least restrictive device within 7 day(s). 3.  Patient will perform sit to stand with minimal assistance/contact guard assist within 7 day(s). 4.  Patient will be supervision for HEP within 7 days. Outcome: Progressing Towards Goal 
 PHYSICAL THERAPY EVALUATION Patient: Jenny Donato (61 y.o. female) Date: 7/1/2020 Primary Diagnosis: Hip fracture ([facility] [address] [place] Failure to thrive (0-17) [R62.51] Precautions:   NWB(LLE) ASSESSMENT Based on the objective data described below, the patient presents with decreased functional mobility from baseline level of function. Patient is a poor historian and unable to provide much information regarding PLOF. Patient also having difficulty maintaining any weight bearing status and per PA attempt as minimal weightbearing as possible. Recommend only bed to chair transfers at this time to limit any weight bearing. Currently maxA to come to EOB and modA x 2 for sit to stand and transfer to commode. No report of pain during session and is not able to maintain any NWB on the L. Will plan to continue to follow for bed>chair>commode transfers. Anticipate she may be able to go home with family vs return to [facility] if they continue just bed to chair transfers. Otherwise may need SNF rehab.  
 
Other factors to consider for discharge: advanced dementia, cannot follow WB precautions, at risk for falls, at risk for fx displacement with continued WB Patient will benefit from skilled therapy intervention to address the above noted impairments. PLAN : 
Recommendations and Planned Interventions: bed mobility training, transfer training, gait training, therapeutic exercises, patient and family training/education, and therapeutic activities Frequency/Duration: Patient will be followed by physical therapy:  5 times a week to address goals. Recommendation for discharge: (in order for the patient to meet his/her long term goals) Therapy up to 5 days/week in SNF setting vs [facility] PT with 24/7 supervision/physical assistance and focus on transfers and no ambulation IF patient discharges home will need the following DME: bedside commode, hospital bed, rolling walker, and wheelchair SUBJECTIVE:  
Patient stated I need to go to the bathroom.  OBJECTIVE DATA SUMMARY:  
HISTORY:   
Past Medical History:  
Diagnosis Date Dementia ([facility] [address] Endocrine disease   
 hypothyroidism Hypertension Ill-defined condition   
 macular degeneration Ill-defined condition   
 chronic back pain Psychiatric disorder   
 anxiety, agitation Past Surgical History:  
Procedure Laterality Date HX CHOLECYSTECTOMY Personal factors and/or comorbidities impacting plan of care:  
 
Home Situation Home Environment: Long term care One/Two Story Residence: One story Living Alone: No 
Support Systems: Assisted living, Friends \ neighbors, Family member(s) Patient Expects to be Discharged to[de-identified] Skilled nursing facility Current DME Used/Available at Home: None EXAMINATION/PRESENTATION/DECISION MAKING:  
Critical Behavior: 
Neurologic State: Alert, Confused(tends to keep her eyes closed) Orientation Level: Oriented to person, Disoriented to place, Disoriented to situation, Disoriented to time Cognition: Decreased attention/concentration, Decreased command following, Impaired decision making, Impulsive, Poor safety awareness Safety/Judgement: Decreased awareness of environment, Decreased awareness of need for assistance, Decreased awareness of need for safety, Lack of insight into deficits Hearing: Auditory Auditory Impairment: Hard of hearing, bilateral 
 
Range Of Motion: 
AROM: Generally decreased, functional 
  
  
  
  
  
  
  
Strength:   
Strength: Generally decreased, functional 
  
  
  
  
  
  
Tone & Sensation:  
Tone: Normal 
  
 
Functional Mobility: 
Bed Mobility: 
Rolling: Maximum assistance;Assist x1 Supine to Sit: Maximum assistance;Assist x1 Sit to Supine: Moderate assistance;Assist x2 Scooting: Maximum assistance;Assist x1 Transfers: 
Sit to Stand: Moderate assistance;Assist x2 Stand to Sit: Moderate assistance;Assist x2 Bed to Chair: Moderate assistance;Assist x2 Balance:  
Sitting: Impaired Sitting - Static: Good (unsupported) Sitting - Dynamic: Fair (occasional) Standing: Impaired; With support Standing - Static: Constant support;Poor Standing - Dynamic : Constant support;Poor Functional Measure: 
Barthel Index: 
 
Bathin Bladder: 5 Bowels: 5 Groomin Dressin Feedin Mobility: 0 Stairs: 0 Toilet Use: 0 Transfer (Bed to Chair and Back): 5 Total: 15/100 The Barthel ADL Index: Guidelines 1. The index should be used as a record of what a patient does, not as a record of what a patient could do. 2. The main aim is to establish degree of independence from any help, physical or verbal, however minor and for whatever reason. 3. The need for supervision renders the patient not independent. 4. A patient's performance should be established using the best available evidence. Asking the patient, friends/relatives and nurses are the usual sources, but direct observation and common sense are also important. However direct testing is not needed. 5. Usually the patient's performance over the preceding 24-48 hours is important, but occasionally longer periods will be relevant. 6. Middle categories imply that the patient supplies over 50 per cent of the effort. 7. Use of aids to be independent is allowed. Martina Álvarez., Barthel, D.W. (0814). Functional evaluation: the Barthel Index. [address] [facility] (14)2. [employer] monserrat KEELEY Lott, Cynthia Kurtz., Kerline Marquez., Di, [address] (1999). Measuring the change indisability after inpatient rehabilitation; comparison of the responsiveness of the Barthel Index and Functional [place] Measure. Journal of Neurology, Neurosurgery, and Psychiatry, 66(4), 776-775. GAGE García, NANCY Kendrick, & Alex Sy M.A. (2004.) Assessment of post-stroke quality of life in cost-effectiveness studies: The usefulness of the Barthel Index and the EuroQoL-5D. [facility], 13, 774-83 Pain Rating: 
No c/o pain Activity Tolerance:  
Fair and requires rest breaks Please refer to the flowsheet for vital signs taken during this treatment. After treatment patient left in no apparent distress:  
Supine in bed, Call bell within reach, Bed / chair alarm activated, and sitter present COMMUNICATION/EDUCATION:  
The patients plan of care was discussed with: Physical therapist, Occupational therapist, and Registered nurse. Thank you for this referral. 
Shelbie Robertson, PT, DPT Time Calculation: 25 mins

## 2020-07-01 NOTE — PROGRESS NOTES
[facility] 
1) [facility]- referral sent to [facility] 2) [facility] to review new admission paperwork  
 
3:00pm 
RN from [facility] to come re-evaluate patient today to ensure patient is appropriate for [facility] or will need memory care unit. 9:30am 
MD completed required new admission paperwork for [facility] admission. CM has faxed new admission paperwork to [facility]. CM spoke to MD regarding OBS status. CM attempted to contact patient's daughter to provide update on d/c planning and review observation letter however daughter did not answer and there was not an option to leave a voice message. CM will make another attempt later. George BegumLens, [facility]

## 2020-07-02 NOTE — PROGRESS NOTES
Transition of Care 1) The [address] [place] [address] 2) [facility] on will call CM received a phone call from [address] [facility] with [address] [place] [address]. CM was informed that Pt would need a wheelchair ordered prior to Pt's discharge to [address] [place] [address]. CM notified attending of this. CM sent wheelchair order to [facility]. Medical necessity letter is needed for Wheelchair. Attending notified of this. Deandre Winchester, BS, [facility]

## 2020-07-02 NOTE — PROGRESS NOTES
Hospitalist Progress Note NAME: Jenny Donato :  1921 MRN:  [id number] Assessment / Plan: 
Multiple falls, POA Acute appearing subcapital left femoral neck fracture with mild displacement, POA Covid19 negative. X-ray of the left hip revealed acute appearing subcapital left femoral neck fracture witha mild displacement. No evidence for dislocation. This was discussed with pt's daughter on admission and daughter who is medical power of  refused any surgical intervention at this time. Ortho evaluated pt in the ER. recommended ambulate with walker to help off load the LLE. FU in 2 weeks for repeat XR. Pt is medically stable for discharge, awaiting placement. CM following 
  
Acute kidney injury, POA 
HTN Unknown baseline creatinine but cr is improving with IVF. Will cont' until pt can demonstrate good PO intake. Will discontinue losartan. Cont' low dose BB. Will accept higher BP due to advance age and high fall risks. 
  
Baseline dementia 
haldol prn Sitter prn 
  
Hypothyroidism Continue home Synthroid 
  
Anxiety Continue home meds 
   
Pt on prednisone for unclear reason. Daughter states she thinks pt is on it for recent inflammation at [facility]? Will need to taper Code Status: DNR Surrogate Decision Maker: Her daughter  
DVT Prophylaxis: Heparin GI Prophylaxis: not indicated  
Baseline: Functional  
 
Subjective: Chief Complaint / Reason for Physician Visit Pt seen. No acute event overnight. Discussed with RN events overnight. Review of Systems: 
Symptom Y/N Comments  Symptom Y/N Comments Fever/Chills    Chest Pain Poor Appetite    Edema Cough    Abdominal Pain Sputum    Joint Pain SOB/TRISTAN    Pruritis/Rash Nausea/vomit    Tolerating PT/OT Diarrhea    Tolerating Diet Constipation    Other Could NOT obtain due to: dementia Objective: VITALS:  
Last 24hrs VS reviewed since prior progress note. Most recent are: Patient Vitals for the past 24 hrs: 
 Temp Pulse Resp BP SpO2  
07/02/20 1130 97.6 °F (36.4 °C) 71 16 124/75 100 % 07/02/20 0707 97.7 °F (36.5 °C) 61 16 127/57 100 % 07/02/20 0307 97.7 °F (36.5 °C) 67 16 124/67 97 % 07/01/20 2309 97.3 °F (36.3 °C) 79 18 128/77 96 % 07/01/20 1927 98.2 °F (36.8 °C) 81 18 128/56 95 % 07/01/20 1645 97.3 °F (36.3 °C) 82 18 144/61 95 % No intake or output data in the 24 hours ending 07/02/20 1339 PHYSICAL EXAM: 
General: Elderly female sleeping in bed, wakes up to voice EENT:  EOMI. Anicteric sclerae. MMM Resp:  CTA bilaterally, no wheezing or rales. No accessory muscle use CV:  Regular  rhythm,  No edemaNeurologic:   
Neuro:  AAOx0, confused, GI:  Soft, Non distended, Non tender.  +Bowel sounds Skin:  No rashes. No jaundice Reviewed most current lab test results and cultures  YES Reviewed most current radiology test results   YES Review and summation of old records today    NO Reviewed patient's current orders and MAR    YES 
PMH/ reviewed - no change compared to H&P 
________________________________________________________________________ Care Plan discussed with: 
  Comments Patient x Family RN x Care Manager x Consultant     
                 x Multidiciplinary team rounds were held today with , nursing, pharmacist and clinical coordinator. Patient's plan of care was discussed; medications were reviewed and discharge planning was addressed. ________________________________________________________________________ Total NON critical care TIME:  35   Minutes Total CRITICAL CARE TIME Spent:   Minutes non procedure based Comments >50% of visit spent in counseling and coordination of care    
________________________________________________________________________ Cheyanne Edmondson MD  
 
Procedures: see electronic medical records for all procedures/Xrays and details which were not copied into this note but were reviewed prior to creation of Plan. LABS: 
I reviewed today's most current labs and imaging studies. Pertinent labs include: 
Recent Labs  
  06/29/20 
2350 WBC 7.0 HGB 11.5 HCT 34.6*  Recent Labs  
  06/29/20 
2350   
K 4.1  CO2 22 * BUN 20  
CREA 0.91  
CA 8.4* ALB 2.6* TBILI 0.4 ALT 24 Signed: Ramona Iqbal MD

## 2020-07-02 NOTE — PROGRESS NOTES
[facility] 1) The [address] 2) [facility] on will call 2:15pm 
CM received return phone call from [facility] at [employer] who reported that they have accepted patient and will be ready for patient by tomorrow, if medically stable for d/c at that point. Per [facility] she will not be in the office tomorrow and the point of contact is going to be Ashley Cervantes [phone], this is the main number, ask for Ashley Cervantes). Per Julissa Puentes is also the individual to call report to. [facility] is on will call. 11:25am 
CM left message for [facility] ([phone]) in admissions at [address] [place] [address] requesting a return phone call regarding update on patient's referral process. Loly Silvestre, [address]

## 2020-07-02 NOTE — PROGRESS NOTES
Bedside and Verbal shift change report given to [facility] (oncoming nurse) by [facility] (offgoing nurse). Report included the following information SBAR, Kardex, Intake/Output, MAR and Recent Results.

## 2020-07-02 NOTE — PROGRESS NOTES
Bedside and Verbal shift change report given to Chalo Orosco RN  (oncoming nurse) by Christi Lawton  (offgoing nurse). Report included the following information SBAR, Kardex, Procedure Summary, Intake/Output, MAR, Accordion and Recent Results.

## 2020-07-02 NOTE — PROGRESS NOTES
Problem: Mobility Impaired (Adult and Pediatric) Goal: *Acute Goals and Plan of Care (Insert Text) Description: FUNCTIONAL STATUS PRIOR TO ADMISSION: Patient unable to provide PLOF secondary to confusion. HOME SUPPORT PRIOR TO ADMISSION: Patient was living in retirement per chart. Physical Therapy Goals Initiated 7/1/2020 1. Patient will move from supine to sit and sit to supine  in bed with minimal assistance/contact guard assist within 7 day(s). 2.  Patient will transfer from bed to chair and chair to bed with minimal assistance/contact guard assist using the least restrictive device within 7 day(s). 3.  Patient will perform sit to stand with minimal assistance/contact guard assist within 7 day(s). 4.  Patient will be supervision for HEP within 7 days. Note: PHYSICAL THERAPY TREATMENT Patient: Latosha Cedillo (29 y.o. female) Date: 7/2/2020 Diagnosis: Hip fracture ([facility] [address] Kashif Pack Failure to thrive (0-17) [R62.51] Hip fracture ([facility] [address] Kashif Pack Precautions: NWB(LLE) Chart, physical therapy assessment, plan of care and goals were reviewed. ASSESSMENT: Patient continues with skilled PT services and is progressing slowly towards goals, pt is unable to maintain NWB on LLE and should be limited to stand pivot transfers to [facility] and chair, some confusion at times, vc's for safety. Current Level of Function Impacting Discharge (mobility/balance): max assist x2 PLAN : 
Patient continues to benefit from skilled intervention to address the above impairments. Continue treatment per established plan of care. to address goals. Recommendation for discharge: (in order for the patient to meet his/her long term goals) Therapy up to 5 days/week in SNF setting This discharge recommendation: Has not yet been discussed the attending provider and/or case management IF patient discharges home will need the following DME: bedside commode, rolling walker, and wheelchair OBJECTIVE DATA SUMMARY:  
 
Critical Behavior: 
Neurologic State: Alert Orientation Level: Oriented to person, Disoriented to time, Disoriented to situation Cognition: Follows commands Safety/Judgement: Decreased awareness of environment, Decreased awareness of need for assistance, Decreased awareness of need for safety, Lack of insight into deficits Functional Mobility Training: 
Bed Mobility: 
Rolling: Maximum assistance;Assist x1 Supine to Sit: Maximum assistance;Assist x1 Scooting: Maximum assistance;Assist x1 Level of Assistance: Maximum assistance Interventions: Tactile cues; Verbal cues Transfers: 
Sit to Stand: Minimum assistance;Assist x2; Additional time Stand to Sit: Minimum assistance;Assist x2 Bed to Chair: Minimum assistance;Assist x2; Additional time Interventions: Tactile cues; Verbal cues;Manual cues Level of Assistance: Minimum assistance;Assist x2; Additional time Balance: 
Sitting: Intact; Without support Sitting - Static: Good (unsupported) Sitting - Dynamic: Not tested Standing: Impaired; With support Standing - Static: Poor;Constant support Standing - Dynamic : Poor;Constant support Ambulation/Gait Training: unable to maintain NWB Pain Ratin Activity Tolerance: Poor After treatment patient left in no apparent distress: Sitting in chair, Call bell within reach, and sitter present COMMUNICATION/COLLABORATION:  
The patients plan of care was discussed with: Registered nurse. Sandrine Hines PTA Time Calculation: 20 mins

## 2020-07-02 NOTE — CDMP QUERY
Good Afternoon, Pt admitted with acute appearing subcapital left femoral neck fracture with mild displacement, and has dementia documented. If possible, please document in the progress notes and discharge summary if you are evaluating and / or treating any of the following: ? Dementia witht behavioral disturbance ? Dementia without behavioral disturbance ? Other, please specify ? Clinically unable to determine The medical record reflects the following: 
 
   Risk Factors: 89 yr. Old female admitted with Acute appearing subcapital left femoral neck fracture with mild displacement. Clinical Indicators:  MD note on 7/2 \"Baseline dementia, Haldol prn Sitter prn\". PN on 7/1 notes \"Pt is very confused and agitated right now. Pulling at IVs\". Treatment: 1:1 Gustavo Milian Thank You, Doralee Lundborg RN, Clinical  
[phone]

## 2020-07-03 NOTE — PROGRESS NOTES
[facility] arrived and given discharge folder with all of its contents as well as pt's most recent set of vitals and personal belongnings.

## 2020-07-03 NOTE — PROGRESS NOTES
Problem: Falls - Risk of 
Goal: *Absence of Falls Description: Document Amber Lyman Fall Risk and appropriate interventions in the flowsheet. Outcome: Progressing Towards Goal 
Note: Fall Risk Interventions: 
Mobility Interventions: Assess mobility with egress test, Communicate number of staff needed for ambulation/transfer, OT consult for ADLs, Patient to call before getting OOB, PT Consult for assist device competence, PT Consult for mobility concerns, Utilize gait belt for transfers/ambulation, Strengthening exercises (ROM-active/passive), Utilize walker, cane, or other assistive device, Bed/chair exit alarm Mentation Interventions: Adequate sleep, hydration, pain control, Evaluate medications/consider consulting pharmacy, Eyeglasses and hearing aids, Door open when patient unattended, Familiar objects from home, Family/sitter at bedside, Increase mobility, More frequent rounding, Reorient patient, Self-releasing belt, Room close to nurse's station, Gait belt with transfers/ambulation, Toileting rounds, Update white board, Bed/chair exit alarm Medication Interventions: Assess postural VS orthostatic hypotension, Evaluate medications/consider consulting pharmacy, Patient to call before getting OOB, Utilize gait belt for transfers/ambulation, Teach patient to arise slowly, Bed/chair exit alarm Elimination Interventions: Bed/chair exit alarm, Call light in reach, Elevated toilet seat, Patient to call for help with toileting needs, Stay With Me (per policy), Toilet paper/wipes in reach, Toileting schedule/hourly rounds History of Falls Interventions: Bed/chair exit alarm, Consult care management for discharge planning, Door open when patient unattended, Evaluate medications/consider consulting pharmacy, Room close to nurse's station, Utilize gait belt for transfer/ambulation, Assess for delayed presentation/identification of injury for 48 hrs (comment for end date), Vital signs minimum Q4HRs X 24 hrs (comment for end date) Problem: Patient Education: Go to Patient Education Activity Goal: Patient/Family Education Outcome: Progressing Towards Goal 
  
Problem: Pressure Injury - Risk of 
Goal: *Prevention of pressure injury Description: Document Zac Scale and appropriate interventions in the flowsheet. Outcome: Progressing Towards Goal 
Note: Pressure Injury Interventions: 
Sensory Interventions: Assess changes in LOC, Assess need for specialty bed, Avoid rigorous massage over bony prominences, Chair cushion, Check visual cues for pain, Discuss PT/OT consult with provider, Keep linens dry and wrinkle-free, Float heels, Use 30-degree side-lying position, Turn and reposition approx. every two hours (pillows and wedges if needed), Sit a 90-degree angle/use footstool if needed, Pressure redistribution bed/mattress (bed type), Pad between skin to skin, Monitor skin under medical devices, Minimize linen layers, Maintain/enhance activity level Moisture Interventions: Absorbent underpads, Apply protective barrier, creams and emollients, Contain wound drainage, Assess need for specialty bed, Check for incontinence Q2 hours and as needed, Offer toileting Q_hr, Moisture barrier, Minimize layers, Maintain skin hydration (lotion/cream), Limit adult briefs Activity Interventions: Assess need for specialty bed, Chair cushion, Increase time out of bed, Pressure redistribution bed/mattress(bed type), PT/OT evaluation Mobility Interventions: Assess need for specialty bed, Chair cushion, Float heels, HOB 30 degrees or less, Pressure redistribution bed/mattress (bed type), PT/OT evaluation Nutrition Interventions: Document food/fluid/supplement intake, Discuss nutritional consult with provider, Offer support with meals,snacks and hydration Friction and Shear Interventions: Apply protective barrier, creams and emollients, Feet elevated on foot rest, Foam dressings/transparent film/skin sealants, HOB 30 degrees or less, Lift sheet, Lift team/patient mobility team, Minimize layers Problem: Patient Education: Go to Patient Education Activity Goal: Patient/Family Education Outcome: Progressing Towards Goal 
  
Problem: Non-Violent Restraints Goal: *Removal from restraints as soon as assessed to be safe Outcome: Progressing Towards Goal 
Goal: *No harm/injury to patient while restraints in use Outcome: Progressing Towards Goal 
Goal: *Patient's dignity will be maintained Outcome: Progressing Towards Goal 
Goal: *Patient Specific Goal (EDIT GOAL, INSERT TEXT) Outcome: Progressing Towards Goal 
Goal: Non-violent Restaints:Standard Interventions Outcome: Progressing Towards Goal 
Goal: Non-violent Restraints:Patient Interventions Outcome: Progressing Towards Goal 
Goal: Patient/Family Education Outcome: Progressing Towards Goal 
  
Problem: Patient Education: Go to Patient Education Activity Goal: Patient/Family Education Outcome: Progressing Towards Goal 
  
Problem: Patient Education: Go to Patient Education Activity Goal: Patient/Family Education Outcome: Progressing Towards Goal

## 2020-07-03 NOTE — PROGRESS NOTES
AVS, HOS NOTE, H&P, MAR report, facesheet, [facility] sheet, DNR order, and hard scripts all placed in discharge folder for when [facility] comes to  pt. PIV taken out with cath tip intact.

## 2020-07-03 NOTE — PROGRESS NOTES
CM received a follow-up phone call from [employer] [facility], notifying CM that they would be able to deliver Pt's wheelchair today. CM was informed that Pt's wheelchair would be delivered to [facility] this afternoon. ROSA notified [facility] with Rosalia Carrera 45, BS, [facility]

## 2020-07-03 NOTE — PROGRESS NOTES
LOUANN PLAN: 
 
1) Admit to [facility] - nursing to call report to [phone] - ask for Corrinne Sailors 2) Transport via [facility] - PCS, Facesheet and H&P placed on chart for transport - PICK-UP time 3:15PM - Daughter, Kristine Jansen, notified. 3) Unable to make Orthopedic follow-up as offices closed from 6/3/2020 to 6/6/2020. Daughter to make ortho and PCP follow-up appts. 4) 2nd IM discussed with patient's daughter - Nickie Everett - via telephone - she is in agreement with discharge. 5) Wheelchair ordered from [facility] - unable to get approval and delivery until 7/6/2020. Daughter aware and will be borrowing wheelchair from [facility] until patient's chair delivered. Care Management Interventions PCP Verified by CM: No(Daughter to establish care with PCP) Mode of Transport at Discharge: BLS([facility] Transport arranged for 3:15pm via [employer]) Transition of Care Consult (CM Consult): Other(New admisison to [facility] - private caregivers with [facility]) MyChart Signup: No 
Discharge Durable Medical Equipment: No 
Health Maintenance Reviewed: Yes Physical Therapy Consult: Yes Occupational Therapy Consult: Yes Speech Therapy Consult: No 
Current Support Network: Assisted Living([facility] and [facility]) Confirm Follow Up Transport: Family The Patient and/or Patient Representative was Provided with a Choice of Provider and Agrees with the Discharge Plan?: Yes Name of the Patient Representative Who was Provided with a Choice of Provider and Agrees with the Discharge Plan: Spoke with pt's dtr Kristine Jansen [facility] of Choice List was Provided with Basic Dialogue that Supports the Patient's Individualized Plan of Care/Goals, Treatment Preferences and Shares the Quality Data Associated with the Providers?: Yes Discharge Location Discharge Placement: Home with home health([facility] with [facility] services) Duane Ferretti, RN, BSN, ACM [facility]   Coordinator [phone]

## 2020-07-03 NOTE — PROGRESS NOTES
Bedside and Verbal shift change report given to CHANTEL Boles  (oncoming nurse) by Fox Euceda RN  (offgoing nurse). Report included the following information SBAR, Kardex, Procedure Summary, Intake/Output, MAR, Accordion and Recent Results.

## 2020-07-03 NOTE — PROGRESS NOTES
Hospital to [facility] SBAR Handoff - Nupur Roberts 
                                                                      80 y.o.   female [address]   Room: [address] [place] [address] [address] [facility]  Unit Phone# :  [phone] [address] 
[facility] 3 ORTHOPEDICS 
[address] [place] [address] Dept: [phone] Loc: [id number] SITUATION Admitted:  6/29/2020         Attending Provider:  Mathew Jose MD    
 
Consultations:  IP CONSULT TO ORTHOPEDIC SURGERY 
 
PCP:  Carlos Garrido, Not On File   None Treatment Team: Attending Provider: Mathew Jose MD; Consulting Provider: Virgene Sandifer; Care Manager: Jose Ferreira; Utilization Review: Larissa Olivera RN; Care Manager: Alexandro Bragg Consulting Provider: Valerie Hewitt MD; Utilization Review: Willy Garcia; Care Manager: Sadie Rodas RN Admitting Dx: Hip fracture ([facility] [address] Bryson Scriver Failure to thrive (0-17) [R62.51] Hip fracture ([facility] [address] Bryson Scriver Principal Problem: <principal problem not specified> * No surgery found * of  
  
BY: * Surgery not found *             ON: * No surgery found * Code Status: DNR Advance Directives:  
Advance Care Planning 6/29/2020 Confirm Advance Directive Yes, on file (Send w/patient) Received Isolation:  There are currently no Active Isolations       MDRO: No current active infections Pain Medications given:  none    Last dose: n/a Special Equipment needed: no  Type of equipment: 
 
 
(Not currently on dialysis) (Not currently on dialysis) (Not currently on dialysis) BACKGROUND Allergies: Allergies Allergen Reactions  Penicillins Unknown (comments) Past Medical History:  
Diagnosis Date  Dementia ([facility] [address]  Endocrine disease   
 hypothyroidism  Hypertension  Ill-defined condition   
 macular degeneration  Ill-defined condition   
 chronic back pain  Psychiatric disorder   
 anxiety, agitation Past Surgical History:  
Procedure Laterality Date  HX CHOLECYSTECTOMY Medications Prior to Admission Medication Sig  
 losartan (COZAAR) 100 mg tablet Take 100 mg by mouth daily.  metoprolol tartrate (LOPRESSOR) 25 mg tablet Take 25 mg by mouth two (2) times a day.  FLUoxetine (PROzac) 20 mg capsule Take 20 mg by mouth daily.  potassium chloride SR (K-Tab) 10 mEq tablet Take 10 mEq by mouth daily.  levothyroxine (SYNTHROID) 25 mcg tablet Take 25 mcg by mouth Daily (before breakfast).  acetaminophen (TYLENOL) 500 mg tablet Take 500 mg by mouth every six (6) hours as needed for Pain.  lidocaine (LIDODERM) 5 % 1 Patch by TransDERmal route two (2) times a day. Apply patch to the affected area for 12 hours a day and remove for 12 hours a day.  busPIRone (BUSPAR) 5 mg tablet Take 5 mg by mouth three (3) times daily (with meals).  [DISCONTINUED] predniSONE (DELTASONE) 50 mg tablet Take 50 mg by mouth daily.  [DISCONTINUED] clonazePAM (KlonoPIN) 0.5 mg tablet Take 0.5 mg by mouth daily. Hard scripts included in transfer packet yes Vaccinations: There is no immunization history on file for this patient. Readmission Risks:    Known Risks: none The Charlson CoMorbitiy Index tool is an evidenced based tool that has more automatic generated information. The tool looks at many different items such as the age of the patient, how many times they were admitted in the last calendar year, current length of stay in the hospital and their diagnosis. All of these items are pulled automatically from information documented in the chart from various places and will generate a score that predicts whether a patient is at low (less than 13), medium (13-20) or high (21 or greater) risk of being readmitted. ASSESSMENT Temp: 98.5 °F (36.9 °C) (07/03/20 1300) Pulse (Heart Rate): 71 (07/03/20 1300) Resp Rate: 18 (07/03/20 1300)           BP: 157/72 (07/03/20 1300) O2 Sat (%): 96 % (07/03/20 1300) Weight: 58.7 kg (129 lb 6.6 oz)    Height: 5' 4.5\" (163.8 cm) (06/29/20 7954) If above not within 1 hour of discharge: 
 
BP:_____  P:____  R:____ T:_____ O2 Sat: ___%  O2: ______ Active Orders Diet DIET CARDIAC Regular Orientation: only aware of  person Active Behaviors: Active Lines/Drains:  (Peg Tube / Sin / CL or S/L?): no 
 
Urinary Status: Voiding, Incontinent     Last BM: Last Bowel Movement Date: 07/01/20 Skin Integrity: Tear Mobility: Slightly limited Weight Bearing Status: WBAT (Weight Bearing as Tolerated) Lab Results Component Value Date/Time Glucose 109 (H) 07/03/2020 03:28 AM  
 INR 1.0 06/29/2020 12:04 PM  
 HGB 11.3 (L) 07/03/2020 03:28 AM  
 HGB 11.5 06/29/2020 11:50 PM  
  
  RECOMMENDATION See After Visit Summary (AVS) for: · Discharge instructions · After [address] · Special equipment needed (entered pre-discharge by Care Management) · Medication Reconciliation · Follow up Appointment(s) Report given/sent by:  Pat Melvin RN Verbal report given to: Marv Anders RN Estimated discharge time:  7/3/2020 at 1515

## 2020-07-04 PROBLEM — I61.9 HEMORRHAGIC STROKE (HCC): Status: ACTIVE | Noted: 2020-01-01

## 2020-07-05 PROBLEM — I63.9 CVA (CEREBRAL VASCULAR ACCIDENT) (HCC): Status: ACTIVE | Noted: 2020-01-01

## 2020-07-05 NOTE — ED NOTES
Called and updated [place] about the patient's status and that she will not be coming back to the facility. Spoke with Deb Saul.

## 2020-07-05 NOTE — ED NOTES
Report given to [facility], RN. They were informed of patient chief complaint, current status, orders completed (to include IV access/medications/radiology testing), outstanding orders that still need to be completed, and the treatment plan. Ensured no questions or concerns regarding the patient prior to departure.

## 2020-07-05 NOTE — PROGRESS NOTES
LOUANN PLAN:  Hospice Consult received for Hospice info session - sent to [facility] IRINA via CC. TC to oJse , [facility], to advise of consult. She is at [address] [facility] this AM and will be coming to [address] [facility] later today. Mike Milton, BENNIE

## 2020-07-05 NOTE — ACP (ADVANCE CARE PLANNING)
Advance Care Planning Note NAME: Neha Simeon :  1921 MRN:  [id number] Date/Time:  2020 10:47 PM 
 
Active Diagnoses: 
Hospital Problems  Never Reviewed Codes Class Noted POA Hemorrhagic stroke ([facility] [address] ICD-10-CM: I61.9 ICD-9-CM: 561  2020 Unknown These active diagnoses are of sufficient risk that focused discussion on advance care planning is indicated in order to allow the patient to thoughtfully consider personal goals of care, and if situations arise that prevent the ability to personally give input, to ensure appropriate representation of their personal desires for different levels and aggressiveness of care. Discussion:  
Code status addressed with the daughter who  is power of  and wants her to be a DNR / DNI. Patient wants central line and vasopressors if needed. Patient would also not want a feeding tube, if needed, for nutritional support. Patient  would like to assign Elana Samuel Daughter [phone]  
 
 as the surrogate decision maker. Power of  wants the  patient to be on hospice Persons present and participating in discussion: Brenden Ferguson MD,  Gabriella Lord Time Spent:  
Total time spent face-to-face in education and discussion:   16  minutes.   
 
 
 
Edel Sampson MD  
Hospitalist

## 2020-07-05 NOTE — ED NOTES
Patient brought in by EMS from [facility] for \"hypertension\". Patient had left sided flaccidity upon EMS arrival. Last known well was \"around lunch time\" per staff at [facility]. Daughter advised that the patient has had altered mental status for the last three days. Patient just arrived at [facility] a few days ago after receiving a left hip fracture at [facility]. They decided not to operate and sent her to [facility] for rehab and care. Patient is not on anticoagulants and there is no evidence of head injury upon inspection.

## 2020-07-05 NOTE — CONSULTS
Palliative Medicine Consult Yusuf: [phone]) Patient Name: Mina Arias YOB: 1921 Date of Initial Consult: 7/5/20 Reason for Consult: End stage disease Requesting Provider: Divya Antonio Primary Care Physician: Teresa, Not On File SUMMARY:  
Mina Arias is a 80 y.o. with a past history of dementia, HTN , mult recent falls w/ L hip fracture 6/29/20 who was admitted on 7/4/2020 from [address] [facility] [facility]  after being found minimally responsive and L sided weakness new onset- last seen normal 7 hours ago. Hypertensive on admission, CT head showing hemorrhagic stroke - large areas of acute hemorrhage seen in R parietal lobe. Could be conversation from ischemic infarct although radiology report also has concern for hemorrhagic CNS neoplasm. Current medical issues leading to Palliative Medicine involvement include: care decisions. At first decision was for conservative management, DNR/I but to allow for central line and ICU care for pressors if needed. However now decision is for comfort measures only and IP hospice has been consulted. Social: Pt is from [address] but due to [place] 19 pandemic has been living in [facility] recently- first at [facility] and then [facility]. Family includes dtrs Nya Bolus \"Tushar\" and [address] PALLIATIVE DIAGNOSES:  
1. Unresponsive 2. Restless, agitation 3. Recent L femoral neck fracture managed conservatively 4. Dementia 5. Incr oropharyngeal secretions 6. Debility 7. Falls 8. Goals of care PLAN:  
1. Pt w/ large hemorraghic CVA, goals are for comfort measures. 2. I missed the daughters Jasvir Garcia is mPOA) but appreciate discussions from primary team and hospice liaisons. 3. Pt restless, had nonverbal pain signs. Reviewed note from last hospital stay just last week and had significant agitation then as well, confused, pulling at IVs, lines, required a sitter. Dementia w/ delirium. 4.  Recent hip fracture last month, decline in function, incr falls. 5. Will admit to hospice for GIP care. 6. Morphine 2mg IV every 4h and prn - pain related to hip fx.  
7. Ativan for agitation 1mg IV every 4h and prn.  
8. Secretion management w/ scopolamine patch and Robinul. 9. Initial consult note routed to primary continuity provider and/or primary health care team members 10. Communicated plan of care with: Palliative IDTJanet 192 Team 
 
 GOALS OF CARE / TREATMENT PREFERENCES:  
 
GOALS OF CARE: 
Patient/Health Care Proxy Stated Goals: Comfort TREATMENT PREFERENCES:  
Code Status: DNR Advance Care Planning: 
[x] [facility] [employer] [facility] Interdisciplinary Team has updated the ACP Navigator with Jenniffer and Patient Capacity Primary Decision Maker: Candace Turner - Daughter - [phone] Advance Care Planning 6/29/2020 Confirm Advance Directive Yes, on file Medical Interventions: Comfort measures Other: As far as possible, the palliative care team has discussed with patient / health care proxy about goals of care / treatment preferences for patient. HISTORY:  
 
History obtained from: Chart, family, staff CHIEF COMPLAINT: Cannot obtain due to patient factors HPI/SUBJECTIVE: The patient is:  
[] Verbal and participatory [x] Non-participatory due to: medical condition Pt unresponsive, audible secretions, less agitated now that has been medicated. Clinical Pain Assessment (nonverbal scale for severity on nonverbal patients):  
Clinical Pain Assessment Severity: 0 Activity (Movement): Lying quietly, normal position Duration: for how long has pt been experiencing pain (e.g., 2 days, 1 month, years) Frequency: how often pain is an issue (e.g., several times per day, once every few days, constant) FUNCTIONAL ASSESSMENT:  
 
Palliative Performance Scale (PPS): PPS: 10  PSYCHOSOCIAL/SPIRITUAL SCREENING:  
 
 Palliative IDT has assessed this patient for cultural preferences / practices and a referral made as appropriate to needs (Cultural Services, Patient Advocacy, Ethics, etc.) Any spiritual / Taoist concerns: 
[] Yes /  [x] No 
 
Caregiver Burnout: 
[] Yes /  [x] No /  [] No Caregiver Present Anticipatory grief assessment:  
[x] Normal  / [] Maladaptive ESAS Anxiety: Anxiety: 3 ESAS Depression:   Cannot obtain due to patient factors REVIEW OF SYSTEMS:  
 
Positive and pertinent negative findings in ROS are noted above in HPI. The following systems were [x] reviewed / [] unable to be reviewed as noted in HPI Other findings are noted below. Systems: constitutional, ears/nose/mouth/throat, respiratory, gastrointestinal, genitourinary, musculoskeletal, integumentary, neurologic, psychiatric, endocrine. Positive findings noted below. Modified ESAS Completed by: provider Fatigue: 10 Drowsiness: 10 Pain: 0 Anxiety: 3 Dyspnea: 3 PHYSICAL EXAM:  
 
From RN flowsheet: 
Wt Readings from Last 3 Encounters:  
07/04/20 129 lb 6.6 oz (58.7 kg) 06/29/20 129 lb 6.6 oz (58.7 kg) Blood pressure 152/83, pulse 97, temperature 98.5 °F (36.9 °C), resp. rate 18, height 5' 3\" (1.6 m), weight 129 lb 6.6 oz (58.7 kg), SpO2 91 %. Pain Scale 1: Adult Nonverbal Pain Scale Pain Intensity 1: 0 Constitutional: lethargic, not arousable, thin, appears younger than stated age Eyes: pupils reactive ENMT: no nasal discharge, moist mucous membranes, incr oropharyngeal secretions Cardiovascular: regular rhythmt Respiratory: breathing min labored, coarse anteriorly Gastrointestinal: soft Musculoskeletal: no deformity, no tenderness to palpation Skin: warm, dry Neurologic: following commands, moving all extremities Psychiatric:agitated earlier, now calm HISTORY:  
 
Active Problems: 
  Hemorrhagic stroke ([facility] [address] (7/4/2020) Past Medical History:  
Diagnosis Date  Dementia ([facility] [address]  Endocrine disease   
 hypothyroidism  Hypertension  Ill-defined condition   
 macular degeneration  Ill-defined condition   
 chronic back pain  Psychiatric disorder   
 anxiety, agitation Past Surgical History:  
Procedure Laterality Date  HX CHOLECYSTECTOMY No family history on file. History reviewed, no pertinent family history. Social History Tobacco Use  Smoking status: Never Smoker  Smokeless tobacco: Never Used Substance Use Topics  Alcohol use: Not Currently Allergies Allergen Reactions  Penicillins Unknown (comments) No current facility-administered medications for this encounter. No current outpatient medications on file. Facility-Administered Medications Ordered in Other Encounters Medication Dose Route Frequency  ondansetron (ZOFRAN) injection 4 mg  4 mg IntraVENous Q4H PRN  
 LORazepam (ATIVAN) injection 1 mg  1 mg IntraVENous Q15MIN PRN  
 ketorolac (TORADOL) injection 30 mg  30 mg IntraVENous Q8H PRN  
 bisacodyL (DULCOLAX) suppository 10 mg  10 mg Rectal DAILY PRN  
 morphine injection 2 mg  2 mg IntraVENous Q15MIN PRN  
 morphine injection 2 mg  2 mg IntraVENous Q4H  
 LORazepam (ATIVAN) injection 1 mg  1 mg IntraVENous Q4H  
 glycopyrrolate (ROBINUL) injection 0.2 mg  0.2 mg IntraVENous Q4H  
 
 
 
 LAB AND IMAGING FINDINGS:  
 
Lab Results Component Value Date/Time WBC 7.0 07/04/2020 07:45 PM  
 HGB 11.8 07/04/2020 07:45 PM  
 PLATELET 876 45/21/6168 07:45 PM  
 
Lab Results Component Value Date/Time Sodium 135 (L) 07/04/2020 07:45 PM  
 Potassium 4.3 07/04/2020 07:45 PM  
 Chloride 104 07/04/2020 07:45 PM  
 CO2 24 07/04/2020 07:45 PM  
 BUN 21 (H) 07/04/2020 07:45 PM  
 Creatinine 1.19 (H) 07/04/2020 07:45 PM  
 Calcium 8.8 07/04/2020 07:45 PM  
  
Lab Results Component Value Date/Time Alk.  phosphatase 124 (H) 07/04/2020 07:45 PM  
 Protein, total 7.1 07/04/2020 07:45 PM  
 Albumin 3.0 (L) 07/04/2020 07:45 PM  
 Globulin 4.1 (H) 07/04/2020 07:45 PM  
 
Lab Results Component Value Date/Time INR 1.0 07/04/2020 07:45 PM  
 Prothrombin time 10.4 07/04/2020 07:45 PM  
  
No results found for: IRON, FE, TIBC, IBCT, PSAT, FERR No results found for: PH, PCO2, PO2 No components found for: Tra Point No results found for: CPK, CKMB Total time:  
Counseling / coordination time, spent as noted above:  
> 50% counseling / coordination?:  
 
Prolonged service was provided for  []30 min   []75 min in face to face time in the presence of the patient, spent as noted above. Time Start:  
Time End:  
Note: this can only be billed with 87530 (initial) or 38990 (follow up). If multiple start / stop times, list each separately.

## 2020-07-05 NOTE — HOSPICE
[facility] Good Help to Those in Need 
[phone] Patient Name: Gail Pedraza YOB: 1921 Age: 80 y.o. [facility] Liaison Note:  
 
Received consult for hospice. Saw patient at the bedside, patient noted to have labored breathing, restlessness, and audible secretions. Primary RN Yasmin gave PRN morphine, lorazepam, and robinul. Patient suctioned orally, moderate amount of secretions cleared secretions cleared. Patient appears appropriate for GIP LOC at this time, spoke with Dr. Colonel Ortiz and she agreed. Daughters coming to hospital shortly, will plan to meet with them when they arrive. Thank you for the opportunity to care for this patient and her family.

## 2020-07-05 NOTE — H&P
Hospitalist Admission Note NAME: Иван Roblero :  1921 MRN:  [id number] Date/Time:  2020 10:32 PM 
 
Patient PCP: Zamzam Kim, Not On File 
______________________________________________________________________ Given the patient's current clinical presentation, I have a high level of concern for decompensation if discharged from the emergency department. Complex decision making was performed, which includes reviewing the patient's available past medical records, laboratory results, and x-ray films. My assessment of this patient's clinical condition and my plan of care is as follows. Assessment / Plan: 
Hemorrhagic stroke Uncontrolled hypertension History of dementia We will admit to oncology as family has decided on hospice and does not want any further intervention, hospice consulted Hold off on getting the rest of the stroke work-up Blood pressure control with PRN hydralazine, keep systolic blood pressure less than 160 Keep n.p.o., speech eval 
CT of the brain showed 1. Large areas of patchy acute hemorrhage within the right parietal lobe with 
surrounding hypodensity, including hypodensity that extends into the splenium of 
the corpus callosum across midline, and extends superiorly within the right 
frontoparietal white matter and into the right temporo-occipital white matter. 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 
given the splenium involvement. This could also possibly represent hemorrhagic 
conversion of an ischemic infarct, among other many differential considerations. There is mass effect on the right lateral ventricle, but no midline shift or 
herniation.  
Recommend MRI brain without and with contrast for further evaluation. 
  
 
Code Status: DNR per family wishes Surrogate Decision Maker: 
Derick Gonzalez Daughter [phone] DVT Prophylaxis: SCDs GI Prophylaxis: IV Pepcid Baseline: ambulatory Subjective: CHIEF COMPLAINT: slumped on left side HISTORY OF PRESENT ILLNESS:    
Tr Ramirez is a 80 y.o.  female past medical history of dementia, hypertension,hypo thyroidism who was sent to the ED from assisted living for concern for stroke. Was found by her daughter on her chair on her  left side and was drooling from the left side. All HPI obtained from the daughter who is at bedside. Patient originally lives in [place], has been living in [place] since [facility], was at [facility] few months ago, fell 3 times there and broke her left hip in June 29 came to the ED and family did not want any intervention. She was then sent to [place] as r[facility] did not want to take her back. In the ED, and was found to be hypertensive with systolic blood pressure in the 919U and diastolic pressure in 149P, her labs revealed normal CBC, elevated creatinine, abnormal LFTs, hyponatremia CT scan of the brain showed hemorrhagic stroke. We decided on hospice, does not want any intervention 1. Large areas of patchy acute hemorrhage within the right parietal lobe with 
surrounding hypodensity, including hypodensity that extends into the splenium of 
the corpus callosum across midline, and extends superiorly within the right 
frontoparietal white matter and into the right temporo-occipital white matter. 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 
given the splenium involvement. This could also possibly represent hemorrhagic 
conversion of an ischemic infarct, among other many differential considerations. There is mass effect on the right lateral ventricle, but no midline shift or 
herniation. 
  
Recommend MRI brain without and with contrast for further evaluation. 
  
We were asked to admit for work up and evaluation of the above problems. Past Medical History:  
Diagnosis Date  Dementia ([facility] [address]  Endocrine disease   
 hypothyroidism  Hypertension  Ill-defined condition   
 macular degeneration  Ill-defined condition   
 chronic back pain  Psychiatric disorder   
 anxiety, agitation Past Surgical History:  
Procedure Laterality Date  HX CHOLECYSTECTOMY Social History Tobacco Use  Smoking status: Never Smoker  Smokeless tobacco: Never Used Substance Use Topics  Alcohol use: Not Currently No family history on file. Allergies Allergen Reactions  Penicillins Unknown (comments) Prior to Admission medications Medication Sig Start Date End Date Taking? Authorizing Provider  
losartan (COZAAR) 100 mg tablet Take 100 mg by mouth daily. Yes Marino, MD Maria Teresa  
predniSONE (DELTASONE) 10 mg tablet 40 mg daily for 3 days 30 mg daily for 3 days 20 mg daily for 3 days 10 mg daily for 3 days and stop it 7/3/20  Yes Delmy Sheridan MD  
metoprolol tartrate (LOPRESSOR) 25 mg tablet Take 25 mg by mouth two (2) times a day. Yes Marino, MD Maria Teresa  
FLUoxetine (PROzac) 20 mg capsule Take 20 mg by mouth daily. Yes Maria Teresa Pichardo MD  
levothyroxine (SYNTHROID) 25 mcg tablet Take 25 mcg by mouth Daily (before breakfast). Yes Marino, MD Maria Teresa  
lidocaine (LIDODERM) 5 % 1 Patch by TransDERmal route two (2) times a day. Apply patch to the affected area for 12 hours a day and remove for 12 hours a day. Yes Marino, MD Maria Teresa  
busPIRone (BUSPAR) 5 mg tablet Take 5 mg by mouth three (3) times daily (with meals). Yes Marino, MD Maria Teresa  
potassium chloride (KLOR-CON) 10 mEq tablet  7/2/20   OtherMaria Teresa MD  
acetaminophen (TYLENOL) 500 mg tablet Take 500 mg by mouth every six (6) hours as needed for Pain. Maria Teresa Pichardo MD  
 
 
REVIEW OF SYSTEMS:    
I am not able to complete the review of systems because: The patient is intubated and sedated  
x The patient has altered mental status due to his acute medical problems The patient has baseline aphasia from prior stroke(s) x The patient has baseline dementia and is not reliable historian The patient is in acute medical distress and unable to provide information Objective: VITALS:   
Visit Vitals BP (!) 175/115 (BP 1 Location: Right arm, BP Patient Position: At rest;Supine) Pulse 93 Temp 98.8 °F (37.1 °C) Resp 18 Ht 5' 3\" (1.6 m) Wt 58.7 kg (129 lb 6.6 oz) SpO2 95% BMI 22.92 kg/m² PHYSICAL EXAM: 
 
General:    Lethargic, her eyes are closed, HEENT: Atraumatic, anicteric sclerae, pink conjunctivae No oral ulcers, mucosa moist, throat clear, dentition fair Neck:  Supple, symmetrical,  thyroid: non tender Lungs:   Clear to auscultation bilaterally. No Wheezing or Rhonchi. No rales. Chest wall:  No tenderness  No Accessory muscle use. Heart:   Regular  rhythm,  No  murmur   No edema Abdomen:   Soft, non-tender. Not distended. Bowel sounds normal 
Extremities: No cyanosis. No clubbing,   
  Skin turgor normal, Capillary refill normal, Radial dial pulse 2+ Skin:     Not pale. Not Jaundiced  No rashes Psych:  Unable to assess Neurologic: EOMs intact. No facial asymmetry. + slurred speech. Left-sided hemiplegia 
_______________________________________________________________________ Care Plan discussed with: 
  Comments Patient Family  x  daughter who is the power of  at bedside RN x Care Manager Consultant:     
_______________________________________________________________________ Expected  Disposition:  
Home with Family HH/PT/OT/RN   
SNF/LTC   
RACHEL   
________________________________________________________________________ TOTAL TIME: 65 Minutes Critical Care Provided     Minutes non procedure based Comments  
 x Reviewed previous records  
>50% of visit spent in counseling and coordination of care x Discussion with patient and/or family and questions answered ________________________________________________________________________ Signed: Michael Cook MD 
 
Procedures: see electronic medical records for all procedures/Xrays and details which were not copied into this note but were reviewed prior to creation of Plan. LAB DATA REVIEWED:   
Recent Results (from the past 24 hour(s)) GLUCOSE, POC Collection Time: 07/04/20  7:40 PM  
Result Value Ref Range Glucose (POC) 149 (H) 65 - 100 mg/dL Performed by Rusty Barajas RN   
CBC WITH AUTOMATED DIFF Collection Time: 07/04/20  7:45 PM  
Result Value Ref Range WBC 7.0 3.6 - 11.0 K/uL  
 RBC 4.06 3.80 - 5.20 M/uL  
 HGB 11.8 11.5 - 16.0 g/dL HCT 34.9 (L) 35.0 - 47.0 % MCV 86.0 80.0 - 99.0 FL  
 MCH 29.1 26.0 - 34.0 PG  
 MCHC 33.8 30.0 - 36.5 g/dL  
 RDW 14.1 11.5 - 14.5 % PLATELET 131 162 - 289 K/uL MPV 9.2 8.9 - 12.9 FL  
 NRBC 0.0 0  WBC ABSOLUTE NRBC 0.00 0.00 - 0.01 K/uL NEUTROPHILS 81 (H) 32 - 75 % LYMPHOCYTES 12 12 - 49 % MONOCYTES 6 5 - 13 % EOSINOPHILS 0 0 - 7 % BASOPHILS 0 0 - 1 % IMMATURE GRANULOCYTES 1 (H) 0.0 - 0.5 % ABS. NEUTROPHILS 5.6 1.8 - 8.0 K/UL  
 ABS. LYMPHOCYTES 0.9 0.8 - 3.5 K/UL  
 ABS. MONOCYTES 0.4 0.0 - 1.0 K/UL  
 ABS. EOSINOPHILS 0.0 0.0 - 0.4 K/UL  
 ABS. BASOPHILS 0.0 0.0 - 0.1 K/UL  
 ABS. IMM. GRANS. 0.1 (H) 0.00 - 0.04 K/UL  
 DF AUTOMATED METABOLIC PANEL, COMPREHENSIVE Collection Time: 07/04/20  7:45 PM  
Result Value Ref Range Sodium 135 (L) 136 - 145 mmol/L Potassium 4.3 3.5 - 5.1 mmol/L Chloride 104 97 - 108 mmol/L  
 CO2 24 21 - 32 mmol/L Anion gap 7 5 - 15 mmol/L Glucose 147 (H) 65 - 100 mg/dL BUN 21 (H) 6 - 20 MG/DL Creatinine 1.19 (H) 0.55 - 1.02 MG/DL  
 BUN/Creatinine ratio 18 12 - 20 GFR est AA 51 (L) >60 ml/min/1.73m2 GFR est non-AA 42 (L) >60 ml/min/1.73m2 Calcium 8.8 8.5 - 10.1 MG/DL  Bilirubin, total 0.3 0.2 - 1.0 MG/DL  
 ALT (SGPT) 87 (H) 12 - 78 U/L  
 AST (SGOT) 57 (H) 15 - 37 U/L Alk. phosphatase 124 (H) 45 - 117 U/L Protein, total 7.1 6.4 - 8.2 g/dL Albumin 3.0 (L) 3.5 - 5.0 g/dL Globulin 4.1 (H) 2.0 - 4.0 g/dL A-G Ratio 0.7 (L) 1.1 - 2.2 PROTHROMBIN TIME + INR Collection Time: 07/04/20  7:45 PM  
Result Value Ref Range INR 1.0 0.9 - 1.1 Prothrombin time 10.4 9.0 - 11.1 sec TROPONIN I Collection Time: 07/04/20  7:45 PM  
Result Value Ref Range Troponin-I, Qt. <0.05 <0.05 ng/mL SAMPLES BEING HELD Collection Time: 07/04/20  7:45 PM  
Result Value Ref Range SAMPLES BEING HELD RD   
 COMMENT Add-on orders for these samples will be processed based on acceptable specimen integrity and analyte stability, which may vary by analyte. EKG, 12 LEAD, INITIAL Collection Time: 07/04/20  8:34 PM  
Result Value Ref Range Ventricular Rate 86 BPM  
 Atrial Rate 86 BPM  
 P-R Interval 168 ms QRS Duration 60 ms  
 Q-T Interval 350 ms QTC Calculation (Bezet) 418 ms Calculated P Axis 38 degrees Calculated R Axis -17 degrees Calculated T Axis -3 degrees Diagnosis Normal sinus rhythm Low voltage QRS Inferior infarct , age undetermined No previous ECGs available

## 2020-07-05 NOTE — ED PROVIDER NOTES
EMERGENCY DEPARTMENT HISTORY AND PHYSICAL EXAM 
 
 
Date: 7/4/2020 Patient Name: Danny Willis Patient Age and Sex: 80 y.o. female History of Presenting Illness Chief Complaint Patient presents with  Altered mental status Decreased responsiveness at [facility]. initial call for hypertension  Extremity Weakness  
  left arm and leg flaccidity with no effort against gravity. LKW 1300 today History Provided By: Patient, EMS Ability to gather history was limited by: Altered mental status, dementia HPI: Danny Willis, 80 y.o. female brought to the emergency department for concern for stroke. Last seen normal approximately 7 hours ago. Patient was reportedly found by her daughter at the patient's nursing home, about 30 minutes ago, found to be lethargic, minimally responsive, with left-sided motor weakness, which was an acute change from prior. Patient is not anticoagulated. Patient is unable to provide any meaningful history. She keeps her eyes closed but is able to respond that she does not have any headache or specific complaints. Location:   
Quality:     
Severity:   
Duration:  
Timing:     
Context:   
Modifying factors:  
Associated symptoms:  
 
 
The patient's medical, surgical, family, and social history on file were reviewed by me today. Past Medical History:  
Diagnosis Date  Dementia ([facility] [address]  Endocrine disease   
 hypothyroidism  Hypertension  Ill-defined condition   
 macular degeneration  Ill-defined condition   
 chronic back pain  Psychiatric disorder   
 anxiety, agitation Past Surgical History:  
Procedure Laterality Date  HX CHOLECYSTECTOMY    
 
 
PCP: Teresa, Not On File Past History Past Medical History: 
Past Medical History:  
Diagnosis Date  Dementia ([address]  Endocrine disease   
 hypothyroidism  Hypertension  Ill-defined condition   
 macular degeneration  Ill-defined condition chronic back pain  Psychiatric disorder   
 anxiety, agitation Past Surgical History: 
Past Surgical History:  
Procedure Laterality Date  HX CHOLECYSTECTOMY Family History: No family history on file. Social History: 
Social History Tobacco Use  Smoking status: Never Smoker  Smokeless tobacco: Never Used Substance Use Topics  Alcohol use: Not Currently  Drug use: Never Allergies: Allergies Allergen Reactions  Penicillins Unknown (comments) Current Medications: No current facility-administered medications on file prior to encounter. Current Outpatient Medications on File Prior to Encounter Medication Sig Dispense Refill  losartan (COZAAR) 100 mg tablet Take 100 mg by mouth daily.  predniSONE (DELTASONE) 10 mg tablet 40 mg daily for 3 days 30 mg daily for 3 days 20 mg daily for 3 days 10 mg daily for 3 days and stop it 30 Tab 0  
 metoprolol tartrate (LOPRESSOR) 25 mg tablet Take 25 mg by mouth two (2) times a day.  FLUoxetine (PROzac) 20 mg capsule Take 20 mg by mouth daily.  levothyroxine (SYNTHROID) 25 mcg tablet Take 25 mcg by mouth Daily (before breakfast).  lidocaine (LIDODERM) 5 % 1 Patch by TransDERmal route two (2) times a day. Apply patch to the affected area for 12 hours a day and remove for 12 hours a day.  busPIRone (BUSPAR) 5 mg tablet Take 5 mg by mouth three (3) times daily (with meals).  potassium chloride (KLOR-CON) 10 mEq tablet  [DISCONTINUED] clonazePAM (KlonoPIN) 0.5 mg tablet Take 1 Tab by mouth daily for 15 days. Max Daily Amount: 0.5 mg. 15 Tab 0  
 acetaminophen (TYLENOL) 500 mg tablet Take 500 mg by mouth every six (6) hours as needed for Pain. Review of Systems Review of Systems Constitutional: Negative for fever. Neurological: Positive for weakness. Negative for headaches. Psychiatric/Behavioral: Positive for confusion. All other systems reviewed and are negative. Physical Exam  
Vital Signs Patient Vitals for the past 8 hrs: 
 Temp Pulse Resp BP SpO2  
07/04/20 2156 98.8 °F (37.1 °C) 93 18 (!) 175/115 95 % 07/04/20 2001 98.9 °F (37.2 °C) 81 18 171/66 97 % 07/04/20 2000  81 19 167/68 97 % 07/04/20 1941  85 18 (!) 170/109 97 % Physical Exam 
Vitals signs and nursing note reviewed. Constitutional:   
   General: She is not in acute distress. Appearance: Normal appearance. She is well-developed. She is not ill-appearing. Comments: Frail, [address] HENT:  
   Head: Normocephalic and atraumatic. Mouth/Throat:  
   Mouth: Mucous membranes are moist.  
Eyes:  
   General:     
   Right eye: No discharge. Left eye: No discharge. Conjunctiva/sclera: Conjunctivae normal.  
   Pupils: Pupils are equal, round, and reactive to light. Pupils are equal.  
Neck: Musculoskeletal: Normal range of motion and neck supple. Cardiovascular:  
   Rate and Rhythm: Normal rate and regular rhythm. Heart sounds: Normal heart sounds. No murmur. Pulmonary:  
   Effort: Pulmonary effort is normal. No respiratory distress. Breath sounds: Normal breath sounds. No wheezing. Abdominal:  
   General: There is no distension. Palpations: Abdomen is soft. Tenderness: There is no abdominal tenderness. Musculoskeletal: Normal range of motion. General: No deformity. Skin: 
   General: Skin is warm and dry. Findings: No rash. Neurological:  
   Mental Status: She is lethargic and confused. GCS: GCS eye subscore is 3. GCS verbal subscore is 5. GCS motor subscore is 6. Cranial Nerves: Cranial nerves are intact. No cranial nerve deficit or dysarthria. Motor: Weakness present. Comments: Dense motor deficits left upper extremity and left lower extremity Psychiatric:     
   Speech: Speech normal.     
   Behavior: Behavior normal.     
 Cognition and Memory: Cognition normal.  
 
 
 
Diagnostic Study Results Labs Recent Results (from the past 24 hour(s)) GLUCOSE, POC Collection Time: 07/04/20  7:40 PM  
Result Value Ref Range Glucose (POC) 149 (H) 65 - 100 mg/dL Performed by Nicki Dior RN   
CBC WITH AUTOMATED DIFF Collection Time: 07/04/20  7:45 PM  
Result Value Ref Range WBC 7.0 3.6 - 11.0 K/uL  
 RBC 4.06 3.80 - 5.20 M/uL  
 HGB 11.8 11.5 - 16.0 g/dL HCT 34.9 (L) 35.0 - 47.0 % MCV 86.0 80.0 - 99.0 FL  
 MCH 29.1 26.0 - 34.0 PG  
 MCHC 33.8 30.0 - 36.5 g/dL  
 RDW 14.1 11.5 - 14.5 % PLATELET 538 185 - 527 K/uL MPV 9.2 8.9 - 12.9 FL  
 NRBC 0.0 0  WBC ABSOLUTE NRBC 0.00 0.00 - 0.01 K/uL NEUTROPHILS 81 (H) 32 - 75 % LYMPHOCYTES 12 12 - 49 % MONOCYTES 6 5 - 13 % EOSINOPHILS 0 0 - 7 % BASOPHILS 0 0 - 1 % IMMATURE GRANULOCYTES 1 (H) 0.0 - 0.5 % ABS. NEUTROPHILS 5.6 1.8 - 8.0 K/UL  
 ABS. LYMPHOCYTES 0.9 0.8 - 3.5 K/UL  
 ABS. MONOCYTES 0.4 0.0 - 1.0 K/UL  
 ABS. EOSINOPHILS 0.0 0.0 - 0.4 K/UL  
 ABS. BASOPHILS 0.0 0.0 - 0.1 K/UL  
 ABS. IMM. GRANS. 0.1 (H) 0.00 - 0.04 K/UL  
 DF AUTOMATED METABOLIC PANEL, COMPREHENSIVE Collection Time: 07/04/20  7:45 PM  
Result Value Ref Range Sodium 135 (L) 136 - 145 mmol/L Potassium 4.3 3.5 - 5.1 mmol/L Chloride 104 97 - 108 mmol/L  
 CO2 24 21 - 32 mmol/L Anion gap 7 5 - 15 mmol/L Glucose 147 (H) 65 - 100 mg/dL BUN 21 (H) 6 - 20 MG/DL Creatinine 1.19 (H) 0.55 - 1.02 MG/DL  
 BUN/Creatinine ratio 18 12 - 20 GFR est AA 51 (L) >60 ml/min/1.73m2 GFR est non-AA 42 (L) >60 ml/min/1.73m2 Calcium 8.8 8.5 - 10.1 MG/DL Bilirubin, total 0.3 0.2 - 1.0 MG/DL  
 ALT (SGPT) 87 (H) 12 - 78 U/L  
 AST (SGOT) 57 (H) 15 - 37 U/L Alk. phosphatase 124 (H) 45 - 117 U/L Protein, total 7.1 6.4 - 8.2 g/dL Albumin 3.0 (L) 3.5 - 5.0 g/dL Globulin 4.1 (H) 2.0 - 4.0 g/dL A-G Ratio 0.7 (L) 1.1 - 2.2 PROTHROMBIN TIME + INR Collection Time: 07/04/20  7:45 PM  
Result Value Ref Range INR 1.0 0.9 - 1.1 Prothrombin time 10.4 9.0 - 11.1 sec TROPONIN I Collection Time: 07/04/20  7:45 PM  
Result Value Ref Range Troponin-I, Qt. <0.05 <0.05 ng/mL SAMPLES BEING HELD Collection Time: 07/04/20  7:45 PM  
Result Value Ref Range SAMPLES BEING HELD RD   
 COMMENT Add-on orders for these samples will be processed based on acceptable specimen integrity and analyte stability, which may vary by analyte. EKG, 12 LEAD, INITIAL Collection Time: 07/04/20  8:34 PM  
Result Value Ref Range Ventricular Rate 86 BPM  
 Atrial Rate 86 BPM  
 P-R Interval 168 ms QRS Duration 60 ms  
 Q-T Interval 350 ms QTC Calculation (Bezet) 418 ms Calculated P Axis 38 degrees Calculated R Axis -17 degrees Calculated T Axis -3 degrees Diagnosis Normal sinus rhythm Low voltage QRS Inferior infarct , age undetermined No previous ECGs available Radiologic Studies CT CODE NEURO HEAD WO CONTRAST Final Result IMPRESSION:  
1. Large areas of patchy acute hemorrhage within the right parietal lobe with  
surrounding hypodensity, including hypodensity that extends into the splenium of  
the corpus callosum across midline, and extends superiorly within the right  
frontoparietal white matter and into the right temporo-occipital white matter. 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  
given the splenium involvement. This could also possibly represent hemorrhagic  
conversion of an ischemic infarct, among other many differential considerations. There is mass effect on the right lateral ventricle, but no midline shift or  
herniation. Recommend MRI brain without and with contrast for further evaluation. The findings were called to Dr. Josefina Ramirez on 7/4/2020 at 7:52 PM by Dr. Rama Vasquez. [address] CT Results  (Last 48 hours) 07/04/20 1950  CT CODE NEURO HEAD WO CONTRAST Final result Impression:  IMPRESSION:  
1. Large areas of patchy acute hemorrhage within the right parietal lobe with  
surrounding hypodensity, including hypodensity that extends into the splenium of  
the corpus callosum across midline, and extends superiorly within the right  
frontoparietal white matter and into the right temporo-occipital white matter. 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  
given the splenium involvement. This could also possibly represent hemorrhagic  
conversion of an ischemic infarct, among other many differential considerations. There is mass effect on the right lateral ventricle, but no midline shift or  
herniation. Recommend MRI brain without and with contrast for further evaluation. The findings were called to Dr. Chas Mckeon on 7/4/2020 at 7:52 PM by Dr. Jennifer Marina Narrative:  EXAM:  CT CODE NEURO HEAD WO CONTRAST INDICATION:   Code Stroke COMPARISON: None. TECHNIQUE: Unenhanced CT of the head was performed using 5 mm images. Brain and  
bone windows were generated. CT dose reduction was achieved through use of a  
standardized protocol tailored for this examination and automatic exposure  
control for dose modulation. FINDINGS:  
Large areas of patchy acute hemorrhages within the right parietal lobe with  
surrounding hypodensity. There is hypodensity that extends into the splenium of  
the corpus callosum across midline (series 2 image 19). There is mass effect  
which results in partial effacement of the right lateral ventricle, but no  
midline shift. There is additional hypodensity seen extending superiorly from  
the hemorrhagic area within the right frontoparietal lobe and inferiorly into  
the right temporo-occipital white matter. There is no hydrocephalus. The basilar cisterns are patent. No cerebellar tonsillar herniation. Retention cyst in the left maxillary sinus. The mastoid air cells and middle  
ears are clear. The orbital contents are within normal limits with bilateral  
lens implants. There are no significant osseous or extracranial soft tissue  
lesions. CXR Results  (Last 48 hours) None Procedures EKG Date/Time: 7/4/2020 10:29 PM 
Performed by: Butch Louise MD 
Authorized by: Butch Louise MD  
 
ECG reviewed by ED Physician in the absence of a cardiologist: yes Interpretation: Interpretation: non-specific Rate:  
  ECG rate assessment: normal   
Rhythm:  
  Rhythm: sinus rhythm Ectopy:  
  Ectopy: none QRS:  
  QRS axis:  Normal 
ST segments: ST segments:  Normal 
T waves:  
  T waves: normal   
CRITICAL CARE (ASAP ONLY) Performed by: Butch Louise MD 
Authorized by: Butch Louise MD  
 
Critical care provider statement:  
  Critical care time (minutes):  45 
  Critical care was necessary to treat or prevent imminent or life-threatening deterioration of the following conditions:  CNS failure or compromise Critical care was time spent personally by me on the following activities:  Ordering and performing treatments and interventions, ordering and review of radiographic studies, pulse oximetry, re-evaluation of patient's condition, examination of patient and development of treatment plan with patient or surrogate Medical Decision Making I reviewed the patient's most recent Emergency Dept notes and diagnostic tests 
in formulating my MDM on today's visit. Provider Notes (Medical Decision Making):  
55-year-old female brought to the emergency department for concern for acute stroke, acute confusion, left-sided acute weakness. Patient was taken immediately to CT scan as a stroke code.   She was noted to have dense left-sided deficits, GCS 13, NIH stroke scale of 10. CT scan demonstrates hemorrhagic stroke. Per radiologist verbal report, this appears most likely to be ischemic stroke with hemorrhagic transformation, versus intracranial mass with associated hemorrhage. I spoke with the patient's daughter who was at the bedside. She feels strongly that the patient would not want neurosurgical intervention, and requests hospice/palliative care, DNR/DNI. Palliative care was consulted. Admit to hospitalist.  Comfort measures only. No indication for neurology or neurosurgical consult. Jim Mattson MD 
10:26 PM 
 
 
 
Social History Tobacco Use  Smoking status: Never Smoker  Smokeless tobacco: Never Used Substance Use Topics  Alcohol use: Not Currently  Drug use: Never Patient Vitals for the past 4 hrs: 
 Temp Pulse Resp BP SpO2  
07/04/20 2156 98.8 °F (37.1 °C) 93 18 (!) 175/115 95 % 07/04/20 2001 98.9 °F (37.2 °C) 81 18 171/66 97 % 07/04/20 2000  81 19 167/68 97 % 07/04/20 1941  85 18 (!) 170/109 97 % Consults: Palliative care consult Medications Administered during ED course: 
Medications - No data to display Current Discharge Medication List  
  
  
 
 
Diagnosis and Disposition Disposition:  Admitted Clinical Impression: 1. Hemorrhagic stroke ([facility] [address] Attestation: 
I personally performed the services described in this documentation on this date 7/4/2020 for patient Nupur Roberts. Jim Mattson MD 
 
 
 
I was the first provider for this patient on this visit. To the best of my ability I reviewed relevant prior medical records, electrocardiograms, laboratories, and radiologic studies. The patient's presenting problems were discussed, and the patient was in agreement with the care plan formulated and outlined with them.    
 
Jim Mattson MD 
 
Please note that this dictation was completed with Dragon voice recognition software. Quite often unanticipated grammatical, syntax, homophones, and other interpretive errors are inadvertently transcribed by the computer software. Please disregard these errors and excuse any errors that have escaped final proofreading.

## 2020-07-05 NOTE — HOSPICE
[facility]  [phone]    Inpatient Nursing Admission   Patient Name: Jenny Donato  YOB: 1921  Age: 80 y.o. Date of Hospice Admission: 7/5/2020  Hospice Attending Elected by Patient: Nathan Reza MD  Primary Care Physician: Oriana Riojas, Not On File  Admitting RN: Nishant Morataya  : Dipak Mak      Level of Care (GIP/Routine/Respite): GIP  Facility of Care: [facility]  Patient Room: 1113/01     HOSPICE SUMMARY   ER Visits/ Hospitalizations in past year: Two ED visits and Two inpatient admissions   Hospice Diagnosis: CVA (cerebral vascular accident) [facility]) [I63.9]  Onset Date of Hospice Diagnosis: 7/5/20  Summary of Disease Progression Leading to Hospice Diagnosis: Patient broke her hip on 6/29/20, patient sent to rehab where patient continued to decline and have AMS. Patient brought to hospital 7/4/20 where patient was found to have a hemorraghic stroke. Comfort measures were decided by family. Co-Morbidities:   Patient Active Problem List   Diagnosis Code    Hip fracture ([address] S72.009A    Failure to thrive (0-17) R62.51    Hemorrhagic stroke ([address] I61.9    CVA (cerebral vascular accident) ([facility] [address] I63.9     Diagnoses RELATED to the terminal prognosis: CVA  Other Diagnoses: see above     Rationale for a prognosis of life expectancy of 6 months or less if the disease follows its normal course (Disease Specific History):     Jenny Donato is a 80 y.o. who was admitted to [facility]. The patient's principle diagnosis of CVA has resulted in this admission where family has chosen comfort measures. Functionally, the patient's Palliative Performance Scale has declined over a period of days and is estimated at 10. Objective information that support this patients limited prognosis includes: imaging, vital signs. The patient/family chose comfort measures with the support of Hospice.     Patient meets for GIP LOC as evidenced by need for IV medications to manage symptoms of restlessness, agitation, labored breasting/pain, and secretions. Prognosis estimated based on 07/05/20 clinical assessment is:   [] Few to Many Hours  [x] Hours to Days   [] Few to Many Days   [] Days to Weeks   [] Few to Many Weeks   [] Weeks to Months   [] Few to Many Months    ASSESSMENT    Patient self-reports:  []  Yes    [x] No    SYMPTOMS: Pain/labored breathing, restlessness, secretions     SIGNS/PHYSICAL FINDINGS: Audible secretions, unresponsiveness, restlessness, labored breathing      KARNOFSKY: 10    FAST for all dementia:  N/A     Learning Assessment:  Patient  Is patient willing/able to learn? Patient is unresponsive, unable to learn  What is the highest level of education completed? Unknown   Learning preference (written material, demonstration, visual)? no  Learning barriers (ESOL, [place], poor vision)? Patient is unresponsive    Caregiver  Is caregiver willing to learn care for patient? Yes  What is the highest level of education completed? Unknown   Learning preference (written material, demonstration, visual)? No preference   Learning barriers (ESOL, [place], poor vision)? None noted     CLINICAL INFORMATION     Wt Readings from Last 3 Encounters:   07/04/20 58.7 kg (129 lb 6.6 oz)   06/29/20 58.7 kg (129 lb 6.6 oz)     Ht Readings from Last 3 Encounters:   07/04/20 5' 3\" (1.6 m)   06/29/20 5' 4.5\" (1.638 m)     There is no height or weight on file to calculate BMI. There were no vitals taken for this visit. LAB VALUES  No results found for this visit on 07/05/20 (from the past 12 hour(s)). No results found for this visit on 07/05/20 (from the past 6 hour(s)).   Lab Results   Component Value Date/Time    Protein, total 7.1 07/04/2020 07:45 PM    Albumin 3.0 (L) 07/04/2020 07:45 PM       Currently this patient has:  [] Supplemental O2 [x] Peripheral IV  [] PICC    [] PORT   [] Sin Catheter [] NG Tube   [] PEG Tube [] Ostomy    [] AICD: Has ICD been deactivated? [] Yes [] No:______    PLAN     1. Admit to hospice under GIP LOC for symptom management. 2. Scheduled robinul ordered for secretions  3. Scheduled morphine ordered for pain/labored breathing. PRN morphine available for breakthrough. 4. Scheduled lorazepam ordered for restlessness/agitation. 5. Skilled nursing assessments needed to assess for non verbal signs/cues for pain/distress. Hospice Team Frequency Orders:  Skilled Nurse -   Daily x 7 days /every other day x 7 days with 5 PRN visits for symptom control. MSW - 1 visit for initial assessment/evaluation for family support and need for volunteer services. Jasmine Half - 1 visit for initial assessment/evaluation for spiritual support. ADVANCE CARE PLANNING (Complete in ACP Flow Sheet)   Code Status: DNR  Durable DNR: []  Yes  [x]  No  Code Status Discussed/Confirmed:  Preference for Other Life Sustaining Treatment Discussed/Confirmed:  Hospitalization Preference: Family would like patient to remain under [facility] LOC if patient remains eligible. If patient does stabilize family's only request is that she does not return to [facility] or [facility]. Advance Care Planning 2020   Confirm Advance Directive Yes, on file        Service: [] Yes  []  No      [x] Unknown  Appropriate for Pinning Ceremony:  [] Yes     [] No  Sikh: NO PREFERENCE   Home: [employer]  in [place] ( on [address])          DISCHARGE PLANNING     1. Discharge Plan: [facility] [address] [facility] under [facility] LOC. If patient stabilizes plan is for patient to go home with daughter or find placement for patient to receive hospice services. 2. Patient/Family teaching: Yes  3. Response to patient/family teaching: Receptive     SOCIAL/EMOTIONAL/SPIRITUAL NEEDS     Spiritual Issues Identified: Patient is Jew, [address]  visited patient today.      Psych/ Social/ Emotional Issues Identified: Daughters are upset that their mother had a roberts decline and was unable to go home to [place] where she grew up. Offered emotional support to daughters. Caregiver Support:  [] Provided information on End of Life Care   [] Material Provided: Gone From My Sight or [facility]     CARE COORDINATION   NP. Deloris Lowery  contacted, discharge to hospice order received  Dr. Micky Velasco contacted, agrees to serve as attending provider for hospice and provided verbal certification of terminal illness with life expectancy of 6 months or less. Orders for hospice admission, medications and plan of treatment received. Medication reconciliation completed. MEDS: See medication list below  DME: Per hospital  Supplies: Per hospital  IDT communication to include MD, SN, SW, CH and support team    ALLERGIES AND MEDICATIONS     Allergies:    Allergies   Allergen Reactions    Penicillins Unknown (comments)     Current Facility-Administered Medications   Medication Dose Route Frequency    ondansetron (ZOFRAN) injection 4 mg  4 mg IntraVENous Q4H PRN    LORazepam (ATIVAN) injection 1 mg  1 mg IntraVENous Q15MIN PRN    ketorolac (TORADOL) injection 30 mg  30 mg IntraVENous Q8H PRN    bisacodyL (DULCOLAX) suppository 10 mg  10 mg Rectal DAILY PRN    morphine injection 2 mg  2 mg IntraVENous Q15MIN PRN    morphine injection 2 mg  2 mg IntraVENous Q4H    LORazepam (ATIVAN) injection 1 mg  1 mg IntraVENous Q4H    glycopyrrolate (ROBINUL) injection 0.2 mg  0.2 mg IntraVENous Q4H

## 2020-07-05 NOTE — ED NOTES
Called both daughters and notified them of the room number the patient will be going to as well as the unit number so they can call in the morning to check on her.

## 2020-07-05 NOTE — H&P
[address] [facility] Help to Those in Need  [phone]    Patient Name: Sam Oropeza  YOB: 1921    Date of Provider Hospice Visit: 07/05/20    Level of Care:   [x] General Inpatient (GIP)    [] Routine   [] Respite    Current Location of Care:  [] [address] [place] [address] [] [facility] [x] [address] [] [facility] [] Hospice House [facility]    IF [facility], patient referred from:  [] [address] [place] [address] [] [facility] [] [address] [] [facility] [] Home [] Other:     Date of Original Hospice Admission: 7/5/20  Hospice Medical Director at time of admission: Katiuska Hawkins Hospice Diagnosis: Hemorrhagic stroke   Diagnoses RELATED to the terminal prognosis: somnolence, incr secretions, SOB  Other Diagnoses: L hip fracture, dementia, agitation      HOSPICE SUMMARY       Sam Oropeza is a 80y.o. year old who was admitted to [facility]. She has a hx of dementia, HTN , mult recent falls w/ L hip fracture 6/29/20 (managed conservatively per family request) who was admitted on 7/4/2020 from [address] [facility]  after being found minimally responsive and L sided weakness new onset- last seen normal 7 hours ago. Hypertensive on admission, CT head showing hemorrhagic stroke - large areas of acute hemorrhage seen in R parietal lobe. Could be conversation from ischemic infarct although radiology report also has concern for hemorrhagic CNS neoplasm. Functionally, the patient's Karnofsky and/or Palliative Performance Scale has declined over a period of days and is estimated at 10. The patient is dependent on the following ADLs: all    Objective information that support this patients limited prognosis includes:     7/4/20 CT head   IMPRESSION:  1. Large areas of patchy acute hemorrhage within the right parietal lobe with  surrounding hypodensity, including hypodensity that extends into the splenium of  the corpus callosum across midline, and extends superiorly within the right  frontoparietal white matter and into the right temporo-occipital white matter.   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 This could also possibly represent hemorrhagic  conversion of an ischemic infarct, among other many differential considerations. There is mass effect on the right lateral ventricle, but no midline shift or  Herniation    The patient/family chose comfort measures with the support of Hospice. Pt is from [facility] but due to COVID 19 pandemic has been living in [facility]- [facility] and then [facility]. Family includes dtrs Rafa Carpenter \"Tushar\" and [address]. HOSPICE DIAGNOSES   Active Symptoms:  -Somnolence  -Incr oropharyngeal secretions   -Agitation, restlessness  -Nonverbal pain, recent hip fx     PLAN     1. Admit to inpatient hospice for management of pain, SOB, secretions. 2. Family includes dtr Abdiel Holloway" who is mPOA and Gena. Pt's   2 years ago. They are in agreement w/ comfort measures w/ proactive sx management. 3. Morphine 2mg IV every 4h and prn - pain related to hip fx.   4. Ativan for agitation 1mg IV every 4h and prn.   5. Secretion management w/ scopolamine patch and Robinul. 6.  and SW to support family needs  7. Disposition: Likely death in the hospital   8. Hospice Plan of care was reviewed in detail and agree with current plan of care    Prognosis estimated based on 20 clinical assessment is:   [x] Hours to Days    [] Days to Weeks    [] Other:    Communicated plan of care with: Hospice Case Manager; Hospice IDT; Care Team     GOALS OF CARE     Patient/Medical POA stated Goal of Care: comfortable death     [x] I have reviewed and/or updated ACP information in the Advance Care Planning Navigator. This information is available in the [address] link in the patient's chart header.     Primary Decision [facility] ([phone]):   Primary Decision Maker: Imelda Wilkerson - Daughter - [phone]    Resuscitation Status: DNR  If DNR is there a Durable DNR on file? : [] Yes [x] No (If no, complete Durable DNR)    HISTORY     History obtained from: Family, staff, chart    CHIEF COMPLAINT: Cannot obtain due to patient factors    The patient is:   [] Verbal  [] Nonverbal  [x] Unresponsive    HPI/SUBJECTIVE:  Pt not arousable, earlier today was agitated, not received medication for sx and peaceful.        REVIEW OF SYSTEMS     The following systems were: [] reviewed  [x] unable to be reviewed    Positive ROS include:  Constitutional: fatigue, weakness, in pain, short of breath  Ears/nose/mouth/throat: increased airway secretions  Respiratory:shortness of breath, wheezing  Gastrointestinal:poor appetite, nausea, vomiting, abdominal pain, constipation, diarrhea  Musculoskeletal:pain, deformities, swelling legs  Neurologic:confusion, hallucinations, weakness  Psychiatric:anxiety, feeling depressed, poor sleep  Endocrine:     Adult Non-Verbal Pain Assessment Score: 0    Face  [x] 0   No particular expression or smile  [] 1   Occasional grimace, tearing, frowning, wrinkled forehead  [] 2   Frequent grimace, tearing, frowning, wrinkled forehead    Activity (movement)  [x] 0   Lying quietly, normal position  [] 1   Seeking attention through movement or slow, cautious movement  [] 2   Restless, excessive activity and/or withdrawal reflexes    Guarding  [x] 0   Lying quietly, no positioning of hands over areas of body  [] 1   Splinting areas of the body, tense  [] 2   Rigid, stiff    Physiology (vital signs)  [x] 0   Stable vital signs  [] 1   Change in any of the following: SBP > 20mm Hg; HR > 20/minute  [] 2   Change in any of the following: SBP > 30mm Hg; HR > 25/minute    Respiratory  [x] 0   Baseline RR/SpO2, compliant with ventilator  [] 1   RR > 10 above baseline, or 5% drop SpO2, mild asynchrony with ventilator  [] 2   RR > 20 above baseline, or 10% drop SpO2, asynchrony with ventilator     FUNCTIONAL ASSESSMENT     Palliative Performance Scale (PPS):10 10 PSYCHOSOCIAL/SPIRITUAL ASSESSMENT     Active Problems:    CVA (cerebral vascular accident) ([facility] (7/5/2020)      Past Medical History:   Diagnosis Date    Dementia ([facility] [address] [facility]     Endocrine disease     hypothyroidism    Hypertension     Ill-defined condition     macular degeneration    Ill-defined condition     chronic back pain    Psychiatric disorder     anxiety, agitation      Past Surgical History:   Procedure Laterality Date    HX CHOLECYSTECTOMY        Social History     Tobacco Use    Smoking status: Never Smoker    Smokeless tobacco: Never Used   Substance Use Topics    Alcohol use: Not Currently     No family history on file. Allergies   Allergen Reactions    Penicillins Unknown (comments)      Current Facility-Administered Medications   Medication Dose Route Frequency    ondansetron (ZOFRAN) injection 4 mg  4 mg IntraVENous Q4H PRN    LORazepam (ATIVAN) injection 1 mg  1 mg IntraVENous Q15MIN PRN    ketorolac (TORADOL) injection 30 mg  30 mg IntraVENous Q8H PRN    bisacodyL (DULCOLAX) suppository 10 mg  10 mg Rectal DAILY PRN    morphine injection 2 mg  2 mg IntraVENous Q15MIN PRN    morphine injection 2 mg  2 mg IntraVENous Q4H    LORazepam (ATIVAN) injection 1 mg  1 mg IntraVENous Q4H    glycopyrrolate (ROBINUL) injection 0.2 mg  0.2 mg IntraVENous Q4H        PHYSICAL EXAM     Wt Readings from Last 3 Encounters:   07/04/20 58.7 kg (129 lb 6.6 oz)   06/29/20 58.7 kg (129 lb 6.6 oz)       There were no vitals taken for this visit.     Supplemental O2  [] Yes  [x] NO      Currently this patient has:  [x] Peripheral IV [] PICC  [] PORT [] ICD    [x] Sin Catheter [] NG Tube   [] PEG Tube    [] Rectal Tube [] Drain  [] Other:     Constitutional: unresponsive, face appears younger than stated age, now calm  Eyes: Pupils reactive  ENMT: Incr secretions in mouth  Cardiovascular: Regular rhythm  Respiratory: Coarse anteriorly   Gastrointestinal: Soft  Musculoskeletal:No deformities  Skin:Warm  Neurologic: Not responding to voice or exam  Psychiatric: Not currently agitated       Pertinent Lab and or Imaging Tests:  Lab Results   Component Value Date/Time    Sodium 135 (L) 07/04/2020 07:45 PM    Potassium 4.3 07/04/2020 07:45 PM    Chloride 104 07/04/2020 07:45 PM    CO2 24 07/04/2020 07:45 PM    Anion gap 7 07/04/2020 07:45 PM    Glucose 147 (H) 07/04/2020 07:45 PM    BUN 21 (H) 07/04/2020 07:45 PM    Creatinine 1.19 (H) 07/04/2020 07:45 PM    BUN/Creatinine ratio 18 07/04/2020 07:45 PM    GFR est AA 51 (L) 07/04/2020 07:45 PM    GFR est non-AA 42 (L) 07/04/2020 07:45 PM    Calcium 8.8 07/04/2020 07:45 PM     Lab Results   Component Value Date/Time    Protein, total 7.1 07/04/2020 07:45 PM    Albumin 3.0 (L) 07/04/2020 07:45 PM           Total time:   Counseling / coordination time:   > 50% counseling / coordination?:

## 2020-07-05 NOTE — PROGRESS NOTES
I reviewed pertinent labs and imaging, and discussed /agreed on the plan of care with Dr. Angelica Esparza. Hospitalist Progress Note NAME: Bianca Hu :  1921 MRN:  [id number] Assessment / Plan: 
Hemorrhagic stroke, POA Uncontrolled HTN History of dementia Hypothyroidism · Patient sent to ED from assisted living to r/o stroke. Patients daughter found her in the chair weak on the left side and drooling. · CT head:  Large areas of patchy acute hemorrhage within the right parietal lobe with surrounding hypodensity, including hypodensity that extends into the splenium of the corpus callosum across midline, and extends superiorly within the right frontoparietal white matter and into the right temporo-occipital white matter. 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 This could also possibly represent hemorrhagic conversion of an ischemic infarct, among other many differential considerations. There is mass effect on the right lateral ventricle, but no midline shift or herniation. · Family has decided on hospice care and do not request any further intervention · Hospice consulted · Respect DNR 
· Comfort measures 18.5 - 24.9 Normal weight / Body mass index is 22.92 kg/m². Code status: DNR Prophylaxis: Comfort measures Recommended Disposition: Hospice Subjective: Chief Complaint / Reason for Physician Visit Patient lying in bed unresponsive. NAD. Discussed with RN events overnight. Review of Systems: 
Symptom Y/N Comments  Symptom Y/N Comments Fever/Chills    Chest Pain Poor Appetite    Edema Cough    Abdominal Pain Sputum    Joint Pain SOB/TRISTAN    Pruritis/Rash Nausea/vomit    Tolerating PT/OT Diarrhea    Tolerating Diet Constipation    Other Could NOT obtain due to: Unresponsive Objective: VITALS:  
Last 24hrs VS reviewed since prior progress note. Most recent are: Patient Vitals for the past 24 hrs: 
 Temp Pulse Resp BP SpO2  
07/05/20 0725 98.5 °F (36.9 °C) 97 18 152/83 91 % 07/04/20 2313 99 °F (37.2 °C) 88 18 168/72 95 % 07/04/20 2245 99 °F (37.2 °C) 83 19 173/73 95 % 07/04/20 2156 98.8 °F (37.1 °C) 93 18 (!) 175/115 95 % 07/04/20 2001 98.9 °F (37.2 °C) 81 18 171/66 97 % 07/04/20 2000  81 19 167/68 97 % 07/04/20 1941  85 18 (!) 170/109 97 % Intake/Output Summary (Last 24 hours) at 7/5/2020 1046 Last data filed at 7/5/2020 1306 Gross per 24 hour Intake  Output 550 ml Net -550 ml PHYSICAL EXAM: 
General: Acutely ill-appearing elderly  female    
EENT:  MMM Resp:  CTA bilaterally, no wheezing or rales. No accessory muscle use CV:  Regular rate rhythm,  No edema GI:  Soft, Non distended, Non tender.  +Bowel sounds Neurologic:  Unresponsive, Psych:   Unable to assess Skin:  No rashes. No jaundice Reviewed most current lab test results and cultures  YES Reviewed most current radiology test results   YES Review and summation of old records today    NO Reviewed patient's current orders and MAR    YES 
PMH/ reviewed - no change compared to H&P 
________________________________________________________________________ Care Plan discussed with: 
  Comments Patient x Family RN x Care Manager Consultant Multidiciplinary team rounds were held today with , nursing, pharmacist and clinical coordinator. Patient's plan of care was discussed; medications were reviewed and discharge planning was addressed. ________________________________________________________________________ Total NON critical care TIME:  25   Minutes Total CRITICAL CARE TIME Spent:   Minutes non procedure based Comments >50% of visit spent in counseling and coordination of care    
________________________________________________________________________ Cat Stone NP  
 
 Procedures: see electronic medical records for all procedures/Xrays and details which were not copied into this note but were reviewed prior to creation of Plan. LABS: 
I reviewed today's most current labs and imaging studies. Pertinent labs include: 
Recent Labs  
  07/04/20 1945 07/03/20 
0328 WBC 7.0 7.5 HGB 11.8 11.3* HCT 34.9* 34.9*  
 268 Recent Labs  
  07/04/20 1945 07/03/20 
3171 * 137  
K 4.3 4.2  108 CO2 24 22 * 109* BUN 21* 18  
CREA 1.19* 0.83 CA 8.8 8.7 ALB 3.0*  --   
TBILI 0.3  --   
ALT 87*  --   
INR 1.0  --   
 
 
Signed: Lashay Crocker NP

## 2020-07-05 NOTE — DISCHARGE SUMMARY
Hospitalist Discharge Summary Patient ID: 
Ramiro Cabrera [id number] 
86 y.o. 
2/1/1921 7/4/2020 PCP on record: Teresa, Not On File Admit date: 7/4/2020 Discharge date and time: 7/5/2020 DISCHARGE DIAGNOSIS: 
 
Active Hospital Problems Diagnosis Date Noted  Hemorrhagic stroke ([address] 07/04/2020 CONSULTATIONS: 
IP CONSULT TO PALLIATIVE CARE - PROVIDER Excerpted HPI from H&P of Travis Bear MD: \"Jeny Alvarenga is a 80 y.o.  female past medical history of dementia, hypertension,hypo thyroidism who was sent to the ED from assisted living for concern for stroke. Was found by her daughter on her chair on her  left side and was drooling from the left side. All HPI obtained from the daughter who is at bedside. Patient originally lives in [place], has been [facility] [place] [facility], was at [facility] few months ago, fell 3 times there and broke her left hip in June 29 came to the ED and family did not want any intervention. She was then sent to [facility] as rishi Zambrano did not want to take her back. In the ED, and was found to be hypertensive with systolic blood pressure in the 829U and diastolic pressure in 177L, her labs revealed normal CBC, elevated creatinine, abnormal LFTs, hyponatremia CT scan of the brain showed hemorrhagic stroke. We decided on hospice, does not want any intervention. \" 
 
______________________________________________________________________ DISCHARGE SUMMARY/HOSPITAL COURSE:  for full details see H&P, daily progress notes, labs, consult notes. Bulmaro Christina y.o. female was admitted to [address] [place] [address] on 7/4/2020 and treated for the following medical complaints:  
 
Hemorrhagic stroke, POA Uncontrolled HTN History of dementia Hypothyroidism · Patient sent to ED from assisted living to r/o stroke. Patients daughter found her in the chair weak on the left side and drooling. · CT head:  Large areas of patchy acute hemorrhage within the right parietal lobe with surrounding hypodensity, including hypodensity that extends into the splenium of the corpus callosum across midline, and extends superiorly within the right frontoparietal white matter and into the right temporo-occipital white matter. 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 This could also possibly represent hemorrhagic conversion of an ischemic infarct, among other many differential considerations. There is mass effect on the right lateral ventricle, but no midline shift or herniation. · Family has decided on hospice care and do not request any further intervention · Hospice consulted · Respect DNR 
· Comfort measures  
  
Patient discharged to  hospice. 
_______________________________________________________________________ Patient seen and examined by me on discharge day. Pertinent Findings: 
Gen:    Not in distress Chest: Clear lungs CVS:   Regular rhythm. No edema Abd:  Soft, not distended, not tender Neuro:  Unresponsive  
_______________________________________________________________________ DISCHARGE MEDICATIONS:  
Discharge Medication List as of 7/5/2020 11:49 AM  
  
 
 
 
Patient Follow Up Instructions: Activity: Bedrest 
Diet: Comfort feeding Wound Care: None needed Follow-up Information None 
  
 
________________________________________________________________ Risk of deterioration: High 
 
Condition at Discharge:  Stable 
__________________________________________________________________ Disposition IP Hospice 
 
____________________________________________________________________ Code Status: DNR/DNI 
___________________________________________________________________ Total time in minutes spent coordinating this discharge (includes going over instructions, follow-up, prescriptions, and preparing report for sign off to her PCP) :  31 minutes Signed: 
Sanjuana Mcginnis NP

## 2020-07-05 NOTE — PROGRESS NOTES
Received telephone call from Rev. Lisa Kearns in response to request for  to offer Anointing of the Sick to patient nearing end of life. He will visit today. SPARKLE Mukherjee, [facility], Staff  [facility]  Paging Service  [phone])

## 2020-07-06 NOTE — DISCHARGE INSTRUCTIONS
Patient Discharge Instructions    Tr Ramirez / [id number] : 1921    Admitted 2020 Discharged: 7/3/2020         DISCHARGE DIAGNOSIS:   Multiple falls, POA  Acute appearing subcapital left femoral neck fracture with mild displacement, POA       Acute kidney injury, POA  HTN       Baseline dementia       Hypothyroidism       Anxiety             Take Home Medications     {Medication reconciliation information is now added to the patient's AVS automatically when it is printed. There is no need to use this SmartLink in discharge instructions. Highlight this text and delete it to clear this message}      General drug facts     If you have a very bad allergy, wear an allergy ID at all times. It is important that you take the medication exactly as they are prescribed. Keep your medication in the bottles provided by the pharmacist.  Keep a list of all your drugs (prescription, natural products, vitamins, OTC) with you. Give this list to your doctor. Do not take other medications without consulting your doctor. Do not share your drugs with others and do not take anyone else's drugs. Keep all drugs out of the reach of children and pets. Most drugs may be thrown away in household trash after mixing with coffee grounds or frank litter and sealing in a plastic bag. Keep a list Call your doctor for help with any side effects. If in the [place] you may also call the FDA at 1-611-OTR-1129    Talk with the doctor before starting any new drug, including OTC, natural products, or vitamins. What to do at Home    1. Recommended diet: Cardiac    2. Recommended activity: Activity as tolerated    3. If you experience any of the following symptoms then please call your primary care physician or return to the emergency room if you cannot get hold of your doctor:    4. Wound Care: none     5. Lab work: Cbc and Bmp in 1 week     8. Bring these papers with you to your follow up appointments.  The papers will help your doctors be sure to continue the care plan from the hospital.      Follow-up with:   PCP: Teresa, Not On File  Follow-up Information     Follow up With Specialties Details Why Contact Info    Bsi, Not On File    Not On File (58) Patient has a PCP but that physician is not listed in [address] [place] [address]. Please establlish care with a PCP        Jade Nice, DO Orthopedic Surgery Schedule an appointment as soon as possible for a visit in 2 weeks  [place] [address]              Please call for your own appointment        Information obtained by :  I understand that if any problems occur once I am at home I am to contact my physician. I understand and acknowledge receipt of the instructions indicated above.                                                                                                                                            Physician's or R.N.'s Signature                                                                  Date/Time                                                                                                                                              Patient or Representative Signature                                                          Date/Time

## 2020-07-06 NOTE — HOSPICE
[address] [facility] Help to Those in Need  [phone]    Discharge/Death Nursing Note   Patient Name: Natividad Florez  YOB: 1921  Age: 80 y.o. Date of Death: 20  Admitted Date: 2020  Time of Death: [facility] of Care: [facility]  Level of Care: [facility]  Patient Room: [address] [place] [address]     Hospice Attending: Slade Geiger  Hospice Diagnosis: CVA (cerebral vascular accident) [facility]) [I63.9]    Death Pronouncement   Pronouncement of death completed by: Luci Prince MD    Agency staff was not present at the time of death    At the time of death the patient was documented as : Per Dr. Sam Warren  No corneal reflex. No heart sounds on auscultation. No spontaneous breath sounds.   No withdrawal from painful stimuli.     Time of Death 2.55 AM     The pt  within [address]    The following were notified of the patient's death: Daughter: Mei Conteh by primary nurse    Medications were disposed of per facility protocol     Discharge Summary   Discharge Reason: Death    Summary of Care Provided:    [x] Post mortem care provided by hospital staff  [x] Notification of  home by Nursing Supervisor  [] Referrals/Community resources provided:   [x] Goals completed  [] Durable Medical Equipment vendor notified     Disciplines involved: [x] RN [] SW [x]  [] ZAMORA [] Vol [] PT [] OT [] ST [] [facility]    [x] IDT communication/notification    Attending Physician, Dr. Astrid Conley , notified of death    Bereaved         Advance Care Planning 2020   Confirm Advance Directive Yes, on file

## 2020-07-06 NOTE — PROGRESS NOTES
Responded to page for this pt's death in [address] [place] [address]. No family or friends present during this visit. Consulted Pt's RN to assess if any other support was needed. Affirmed ongoing availability of support. Contact spiritual care services for any spiritual or emotional support needs. Helene Hernández MDiv.  Staff   Request  Support/Spiritual Care Services via [address] [place] [address]

## 2020-07-06 NOTE — PROGRESS NOTES
Problem: Falls - Risk of  Goal: *Absence of Falls  Description: Document Cody Stade Fall Risk and appropriate interventions in the flowsheet. Outcome: Resolved/Met  Note: Fall Risk Interventions:  Mobility Interventions: Patient to call before getting OOB         Medication Interventions: Patient to call before getting OOB, Teach patient to arise slowly    Elimination Interventions: Call light in reach, Toileting schedule/hourly rounds    History of Falls Interventions: Door open when patient unattended, Room close to nurse's station         Problem: Patient Education: Go to Patient Education Activity  Goal: Patient/Family Education  Outcome: Resolved/Met     Problem: Pressure Injury - Risk of  Goal: *Prevention of pressure injury  Description: Document Zac Scale and appropriate interventions in the flowsheet.   Outcome: Resolved/Met  Note: Pressure Injury Interventions:  Sensory Interventions: Assess changes in LOC, Assess need for specialty bed, Keep linens dry and wrinkle-free, Float heels, Minimize linen layers    Moisture Interventions: Absorbent underpads, Check for incontinence Q2 hours and as needed, Internal/External urinary devices    Activity Interventions: Assess need for specialty bed    Mobility Interventions: Assess need for specialty bed, Float heels    Nutrition Interventions: Document food/fluid/supplement intake    Friction and Shear Interventions: HOB 30 degrees or less, Lift sheet, Lift team/patient mobility team, Minimize layers                Problem: Patient Education: Go to Patient Education Activity  Goal: Patient/Family Education  Outcome: Resolved/Met

## 2020-07-06 NOTE — DISCHARGE SUMMARY
Hospitalist Discharge Summary Patient ID: 
Gatito Murdock [id number] 
89 y.o. 
2/1/1921 6/29/2020 PCP on record: Bsbriani, Not On File Admit date: 6/29/2020 Discharge date and time: 7/3/20 DISCHARGE DIAGNOSIS: 
 
Multiple falls, POA Acute appearing subcapital left femoral neck fracture with mild displacement, POA Acute kidney injury, POA 
HTN Baseline dementia Hypothyroidism Anxiety CONSULTATIONS: 
IP CONSULT TO ORTHOPEDIC SURGERY Excerpted HPI from H&P of Leonie Plascencia MD: The patient is a 80years old woman with past medical history dementia, hypothyroidism, hypertension presented emergency department with multiple falls. He was brought by her daughter who is the medical power of . She reported that she has been falling for the last month multiple times. Patient was found to have left hip femoral fracture and orthopedic surgery evaluate the patient in the ED however daughter refused all surgical intervention at this time.  saw the patient also in the emergency department arrange for placement tomorrow. 
  
 
______________________________________________________________________ DISCHARGE SUMMARY/HOSPITAL COURSE:  for full details see H&P, daily progress notes, labs, consult notes. Multiple falls, POA Acute appearing subcapital left femoral neck fracture with mild displacement, POA Covid19 negative. X-ray of the left hip revealed acute appearing subcapital left femoral neck fracture witha mild displacement. No evidence for dislocation. This was discussed with pt's daughter on admission and daughter who is medical power of  refused any surgical intervention at this time. Ortho evaluated pt in the ER. recommended ambulate with walker to help off load the LLE. FU in 2 weeks for repeat XR. Pt is medically stable for discharge, awaiting placement.   CM following 
  
Acute kidney injury, POA 
HTN 
 Unknown baseline creatinine but cr is improving with IVF. Will cont' until pt can demonstrate good PO intake. Will discontinue losartan. Cont' low dose BB. Will accept higher BP due to advance age and high fall risks. 
  
Baseline dementia 
haldol prn Sitter prn 
  
Hypothyroidism Continue home Synthroid 
  
Anxiety Continue home meds Patient seen and examined today, vital signs stable, lab work is at baseline and was cleared by all consultant parties to be discharged for outpatient follow-up. She will need repeat x-rays in about 2 weeks time. I discussed with patient's daughter today and updated her. 
 
_______________________________________________________________________ Patient seen and examined by me on discharge day. Pertinent Findings: 
Gen:    Not in distress Chest: Clear lungs CVS:   Regular rhythm. No edema Abd:  Soft, not distended, not tender Neuro:  Alert, awake 
_______________________________________________________________________ DISCHARGE MEDICATIONS:  
Discharge Medication List as of 7/3/2020  1:27 PM  
  
CONTINUE these medications which have CHANGED Details  
predniSONE (DELTASONE) 10 mg tablet 40 mg daily for 3 days 30 mg daily for 3 days 20 mg daily for 3 days 10 mg daily for 3 days and stop it, Print, Disp-30 Tab, R-0  
  
clonazePAM (KlonoPIN) 0.5 mg tablet Take 1 Tab by mouth daily for 15 days. Max Daily Amount: 0.5 mg., Print, Disp-15 Tab, R-0  
  
  
CONTINUE these medications which have NOT CHANGED Details  
metoprolol tartrate (LOPRESSOR) 25 mg tablet Take 25 mg by mouth two (2) times a day., Historical Med FLUoxetine (PROzac) 20 mg capsule Take 20 mg by mouth daily. , Historical Med  
  
levothyroxine (SYNTHROID) 25 mcg tablet Take 25 mcg by mouth Daily (before breakfast). , Historical Med  
  
acetaminophen (TYLENOL) 500 mg tablet Take 500 mg by mouth every six (6) hours as needed for Pain., Historical Med  
  
 lidocaine (LIDODERM) 5 % 1 Patch by TransDERmal route two (2) times a day. Apply patch to the affected area for 12 hours a day and remove for 12 hours a day., Historical Med  
  
busPIRone (BUSPAR) 5 mg tablet Take 5 mg by mouth three (3) times daily (with meals). , Historical Med  
  
  
STOP taking these medications  
  
 losartan (COZAAR) 100 mg tablet Comments:  
Reason for Stopping:   
   
 potassium chloride SR (K-Tab) 10 mEq tablet Comments:  
Reason for Stopping:   
   
  
 
 
 
Patient Follow Up Instructions: 1. Recommended diet: Cardiac 2. Recommended activity: Activity as tolerated 3. If you experience any of the following symptoms then please call your primary care physician or return to the emergency room if you cannot get hold of your doctor: 
 
4. Wound Care: none 5. Lab work: Cbc and Bmp in 1 week Follow-up Information Follow up With Specialties Details Why Contact Info [facility], Not On File    Not On File (62) Patient has a PCP but that physician is not listed in [facility]. Please establlish care with a PCP Thomas Epstein DO Orthopedic Surgery Schedule an appointment as soon as possible for a visit in 2 weeks  [address] [place] [id number] [phone] 
  
  
 
________________________________________________________________ Risk of deterioration: High 
 
Condition at Discharge:  Stable 
__________________________________________________________________ Disposition Home with family and home health services 
 
____________________________________________________________________ Code Status: DNR/DNI 
___________________________________________________________________ Total time in minutes spent coordinating this discharge (includes going over instructions, follow-up, prescriptions, and preparing report for sign off to her PCP) :  35  minutes Signed: 
Rankin Runner, MD

## 2020-07-06 NOTE — PROGRESS NOTES
Death Note    Physical Exam:  No corneal reflex. No heart sounds on auscultation. No spontaneous breath sounds. No withdrawal from painful stimuli.     Time of Death 2.55 AM

## 2020-07-06 NOTE — PROGRESS NOTES
2310: Bedside shift change report given to Remberto Robertson (oncoming nurse) by Sanjeev Lane (offgoing nurse). Report included the following information SBAR, Kardex, Intake/Output and MAR.     2345: Assessment completed and scheduled medications given. Pt resting comfortably. Noted decrease in urine OP. Bladder scan done. 995mL in bladder. Will contact hospice for Seth order. 2350: Verbal Order received to place Seth catheter. 0020: 14Fr seth placed with 850mL OP immediately. 0100: Pt resting comfortably. 0200: Pt resting comfortably. 0255: Into round on pt. Pt . No audible Heart or Lung sound. No palpable pulses. Pupils fixed and dilated. 0300: Dr. Hilary Paris called to pronounce. Contacted daughter Dustin Sprague to inform of pt passing. Daughter confirmed Hetal Rascon on [facility]. Pt said family would not be coming to bedside. 0315: Contacted Nursing Supervisor, Jacoby Juarez, to inform of pt passing. Paged . 0320:  returned page. Paging Hospice. 0325: Hospice returned page, informed Candida RN of pt passing. [phone]: Called [facility], pt not candidate for donation. Body can be released.  at bedside. 0345: Dr. Hilary Paris at bedside. 26: Post Mortem care completed. 0445: Pt transported to [facility] with out incident. Pt chart given to Mai Adam, Nursing Supervisor.

## 2020-07-08 NOTE — DISCHARGE SUMMARY
Discharge Summary    [facility]  [facility] Help to Those in Need  [phone]      Date of Admission: 7/5/2020  Date of Discharge: 7/6/2020    Del Yanes is a 80y.o. year old who was admitted to [facility] at [facility] with a Hospice diagnosis of CVA (cerebral vascular accident) ([address] [I63.9]. Pt was admitted for [facility] level care. Per HPI:  \"Nancy Sheilah Severin is a 80y.o. year old who was admitted to [facility]. She has a hx of dementia, HTN , mult recent falls w/ L hip fracture 6/29/20 (managed conservatively per family request) who was admitted on 7/4/2020 from NH  after being found minimally responsive and L sided weakness new onset- last seen normal 7 hours ago. Hypertensive on admission, CT head showing hemorrhagic stroke - large areas of acute hemorrhage seen in R parietal lobe. Could be conversation from ischemic infarct although radiology report also has concern for hemorrhagic CNS neoplasm.         Functionally, the patient's Karnofsky and/or Palliative Performance Scale has declined over a period of days and is estimated at 10. The patient is dependent on the following ADLs: all     Objective information that support this patients limited prognosis includes:      7/4/20 CT head   IMPRESSION:  1. Large areas of patchy acute hemorrhage within the right parietal lobe with  surrounding hypodensity, including hypodensity that extends into the splenium of  the corpus callosum across midline, and extends superiorly within the right  frontoparietal white matter and into the right temporo-occipital white matter. 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 This could also possibly represent hemorrhagic  conversion of an ischemic infarct, among other many differential considerations.   There is mass effect on the right lateral ventricle, but no midline shift or  Herniation     The patient/family chose comfort measures with the support of Hospice. Pt is from [facility] but due to COVID 19 pandemic has been living in [address] [facility] recently- first at [facility] and then [facility]. Family includes dtrs Primitivo Vanegas" and Gena. \"    The patient's care was focused on comfort, and the patient passed away on 7/6/2020.     Blake Jarrell MD

## 2024-03-08 NOTE — PROGRESS NOTES
Oncology End of Shift Note      Bedside shift change report given to Sung Garcia RN (incoming nurse) by Brenden Jimenez (outgoing nurse) on Gatito Murdock. Report included the following information SBAR, Kardex, Intake/Output, MAR, Accordion and Recent Results. Shift Summary: pt admitted to [facility] hospice; scheduled meds given; She is very congested and has thick green/yellow phlegm coming out of her mouth continuously and has needed suction a few times to clear her mouth; family at the bedside throughout the day; pt unresponsive       Issues for Physician to Address:       Patient on Cardiac Monitoring?     [] Yes  [] No    Rhythm:          Shift Events        Brenden Jimenez Statement Selected